# Patient Record
Sex: MALE | Race: WHITE | NOT HISPANIC OR LATINO | URBAN - METROPOLITAN AREA
[De-identification: names, ages, dates, MRNs, and addresses within clinical notes are randomized per-mention and may not be internally consistent; named-entity substitution may affect disease eponyms.]

---

## 2017-08-10 ENCOUNTER — INPATIENT (INPATIENT)
Facility: HOSPITAL | Age: 57
LOS: 3 days | Discharge: HOME | DRG: 309 | End: 2017-08-14
Attending: INTERNAL MEDICINE | Admitting: INTERNAL MEDICINE
Payer: COMMERCIAL

## 2017-08-10 VITALS
HEIGHT: 71 IN | HEART RATE: 115 BPM | DIASTOLIC BLOOD PRESSURE: 79 MMHG | TEMPERATURE: 98 F | OXYGEN SATURATION: 96 % | RESPIRATION RATE: 14 BRPM | SYSTOLIC BLOOD PRESSURE: 124 MMHG | WEIGHT: 315 LBS

## 2017-08-10 DIAGNOSIS — I48.91 UNSPECIFIED ATRIAL FIBRILLATION: ICD-10-CM

## 2017-08-10 DIAGNOSIS — Z98.89 OTHER SPECIFIED POSTPROCEDURAL STATES: Chronic | ICD-10-CM

## 2017-08-10 DIAGNOSIS — Z90.49 ACQUIRED ABSENCE OF OTHER SPECIFIED PARTS OF DIGESTIVE TRACT: Chronic | ICD-10-CM

## 2017-08-10 DIAGNOSIS — K74.60 UNSPECIFIED CIRRHOSIS OF LIVER: ICD-10-CM

## 2017-08-10 DIAGNOSIS — E66.9 OBESITY, UNSPECIFIED: ICD-10-CM

## 2017-08-10 LAB
ALBUMIN SERPL ELPH-MCNC: 3.7 G/DL — SIGNIFICANT CHANGE UP (ref 3.3–5)
ALP SERPL-CCNC: 61 U/L — SIGNIFICANT CHANGE UP (ref 30–120)
ALT FLD-CCNC: 38 U/L DA — SIGNIFICANT CHANGE UP (ref 10–60)
ANION GAP SERPL CALC-SCNC: 10 MMOL/L — SIGNIFICANT CHANGE UP (ref 5–17)
APTT BLD: 29.4 SEC — SIGNIFICANT CHANGE UP (ref 27.5–37.4)
AST SERPL-CCNC: 27 U/L — SIGNIFICANT CHANGE UP (ref 10–40)
BASOPHILS # BLD AUTO: 0.1 K/UL — SIGNIFICANT CHANGE UP (ref 0–0.2)
BASOPHILS NFR BLD AUTO: 0.8 % — SIGNIFICANT CHANGE UP (ref 0–2)
BILIRUB SERPL-MCNC: 0.5 MG/DL — SIGNIFICANT CHANGE UP (ref 0.2–1.2)
BUN SERPL-MCNC: 18 MG/DL — SIGNIFICANT CHANGE UP (ref 7–23)
CALCIUM SERPL-MCNC: 8.6 MG/DL — SIGNIFICANT CHANGE UP (ref 8.4–10.5)
CHLORIDE SERPL-SCNC: 106 MMOL/L — SIGNIFICANT CHANGE UP (ref 96–108)
CO2 SERPL-SCNC: 25 MMOL/L — SIGNIFICANT CHANGE UP (ref 22–31)
CREAT SERPL-MCNC: 0.88 MG/DL — SIGNIFICANT CHANGE UP (ref 0.5–1.3)
EOSINOPHIL # BLD AUTO: 0.2 K/UL — SIGNIFICANT CHANGE UP (ref 0–0.5)
EOSINOPHIL NFR BLD AUTO: 1.6 % — SIGNIFICANT CHANGE UP (ref 0–6)
GLUCOSE SERPL-MCNC: 104 MG/DL — HIGH (ref 70–99)
HCT VFR BLD CALC: 41 % — SIGNIFICANT CHANGE UP (ref 39–50)
HGB BLD-MCNC: 13 G/DL — SIGNIFICANT CHANGE UP (ref 13–17)
INR BLD: 1.17 RATIO — HIGH (ref 0.88–1.16)
LYMPHOCYTES # BLD AUTO: 18.4 % — SIGNIFICANT CHANGE UP (ref 13–44)
LYMPHOCYTES # BLD AUTO: 2.2 K/UL — SIGNIFICANT CHANGE UP (ref 1–3.3)
MCHC RBC-ENTMCNC: 29.6 PG — SIGNIFICANT CHANGE UP (ref 27–34)
MCHC RBC-ENTMCNC: 31.8 GM/DL — LOW (ref 32–36)
MCV RBC AUTO: 93.3 FL — SIGNIFICANT CHANGE UP (ref 80–100)
MONOCYTES # BLD AUTO: 0.8 K/UL — SIGNIFICANT CHANGE UP (ref 0–0.9)
MONOCYTES NFR BLD AUTO: 6.8 % — SIGNIFICANT CHANGE UP (ref 2–14)
NEUTROPHILS # BLD AUTO: 8.6 K/UL — HIGH (ref 1.8–7.4)
NEUTROPHILS NFR BLD AUTO: 72.4 % — SIGNIFICANT CHANGE UP (ref 43–77)
PLATELET # BLD AUTO: 195 K/UL — SIGNIFICANT CHANGE UP (ref 150–400)
POTASSIUM SERPL-MCNC: 3.8 MMOL/L — SIGNIFICANT CHANGE UP (ref 3.5–5.3)
POTASSIUM SERPL-SCNC: 3.8 MMOL/L — SIGNIFICANT CHANGE UP (ref 3.5–5.3)
PROT SERPL-MCNC: 7.1 G/DL — SIGNIFICANT CHANGE UP (ref 6–8.3)
PROTHROM AB SERPL-ACNC: 12.8 SEC — HIGH (ref 9.8–12.7)
RBC # BLD: 4.39 M/UL — SIGNIFICANT CHANGE UP (ref 4.2–5.8)
RBC # FLD: 15.6 % — HIGH (ref 10.3–14.5)
SODIUM SERPL-SCNC: 141 MMOL/L — SIGNIFICANT CHANGE UP (ref 135–145)
TROPONIN I SERPL-MCNC: 0.04 NG/ML — SIGNIFICANT CHANGE UP (ref 0.02–0.06)
WBC # BLD: 11.8 K/UL — HIGH (ref 3.8–10.5)
WBC # FLD AUTO: 11.8 K/UL — HIGH (ref 3.8–10.5)

## 2017-08-10 PROCEDURE — 99291 CRITICAL CARE FIRST HOUR: CPT

## 2017-08-10 PROCEDURE — 93306 TTE W/DOPPLER COMPLETE: CPT | Mod: 26

## 2017-08-10 PROCEDURE — 71010: CPT | Mod: 26

## 2017-08-10 PROCEDURE — 93010 ELECTROCARDIOGRAM REPORT: CPT

## 2017-08-10 RX ORDER — SODIUM CHLORIDE 9 MG/ML
1000 INJECTION INTRAMUSCULAR; INTRAVENOUS; SUBCUTANEOUS ONCE
Qty: 0 | Refills: 0 | Status: COMPLETED | OUTPATIENT
Start: 2017-08-10 | End: 2017-08-10

## 2017-08-10 RX ORDER — ASPIRIN/CALCIUM CARB/MAGNESIUM 324 MG
81 TABLET ORAL DAILY
Qty: 0 | Refills: 0 | Status: DISCONTINUED | OUTPATIENT
Start: 2017-08-11 | End: 2017-08-11

## 2017-08-10 RX ORDER — SODIUM CHLORIDE 9 MG/ML
3 INJECTION INTRAMUSCULAR; INTRAVENOUS; SUBCUTANEOUS ONCE
Qty: 0 | Refills: 0 | Status: COMPLETED | OUTPATIENT
Start: 2017-08-10 | End: 2017-08-10

## 2017-08-10 RX ORDER — SODIUM CHLORIDE 9 MG/ML
500 INJECTION INTRAMUSCULAR; INTRAVENOUS; SUBCUTANEOUS
Qty: 0 | Refills: 0 | Status: DISCONTINUED | OUTPATIENT
Start: 2017-08-10 | End: 2017-08-10

## 2017-08-10 RX ORDER — ENOXAPARIN SODIUM 100 MG/ML
40 INJECTION SUBCUTANEOUS DAILY
Qty: 0 | Refills: 0 | Status: DISCONTINUED | OUTPATIENT
Start: 2017-08-10 | End: 2017-08-11

## 2017-08-10 RX ADMIN — Medication 81 MILLIGRAM(S): at 23:54

## 2017-08-10 RX ADMIN — SODIUM CHLORIDE 1000 MILLILITER(S): 9 INJECTION INTRAMUSCULAR; INTRAVENOUS; SUBCUTANEOUS at 22:33

## 2017-08-10 RX ADMIN — SODIUM CHLORIDE 3 MILLILITER(S): 9 INJECTION INTRAMUSCULAR; INTRAVENOUS; SUBCUTANEOUS at 21:02

## 2017-08-10 NOTE — H&P ADULT - ATTENDING COMMENTS
I have discussed care plan with patient expressed understanding of problems treatment and their effect and side effects, alternatives in detail,I have asked if they have any questions and concerns and appropriately addressed them to best of my ability  Reviewed all diagonostic tests, lab results and drug drug interactions, and medications  90 minutes spent on this visit, 50% visit time spent in care co-ordination with other attendings and counselling patient

## 2017-08-10 NOTE — ED ADULT NURSE NOTE - OBJECTIVE STATEMENT
Pt states he has not been feeling well for the past 6 weeks. Pt reports being treated with antibiotics, steroids, and inhaler for a cough and white sputum. Pt reports feeling short of breath, palpitations, weakness and difficulty breathing. Pt is morbidly obese now, breathing with mild difficulty, non-productive cough and flushed face. Pt able to ambulated to stretcher, no dizziness or chest pain. Pt saw Dr. Alamo today where he had an EKG done and was sent here. Pt changed into a gown and EKG done while IVL inserted. NC applied at 3L.

## 2017-08-10 NOTE — ED PROVIDER NOTE - OBJECTIVE STATEMENT
55 y/o morbidly obese M pt with no significant PMHx presents to the ED sent in by doctor for new onset a fib. Pt notes worsening swelling in his lower extremities. No previous hx of a fib. Pt states he is a  and works 14 hours a day, is active on feet throughout the day. Pt denies SOB, calf pain, recent travel or any other complaints at this time. 57 y/o morbidly obese M pt w/ PMHx acute liver failure w/out hepatic coma, A fib, ETOH abuse, hepatic encephalopathy, umbilical hernia presents to the ED sent in by doctor for new onset a fib. Pt notes worsening swelling in his lower extremities. Pt states he is a  and works 14 hours a day, is active on feet throughout the day. Pt denies SOB, calf pain, recent travel or any other complaints at this time.

## 2017-08-10 NOTE — ED ADULT NURSE REASSESSMENT NOTE - NS ED NURSE REASSESS COMMENT FT1
Pt is feeling better following cardizem administration. Pt is breathing easier and denies any chest discomfort. Pt remains on CM for rhythm eval.

## 2017-08-10 NOTE — ED ADULT NURSE NOTE - PMH
Acute liver failure without hepatic coma    Alcohol abuse    Atrial fibrillation, unspecified type    Hepatic encephalopathy    Hernia of abdominal cavity    Umbilical hernia

## 2017-08-10 NOTE — ED PROVIDER NOTE - MEDICAL DECISION MAKING DETAILS
58 y/o M sent in for tachycardia found to have new onset a fib. Plan - rate control, labs, anticoagulation, reevaluation.

## 2017-08-10 NOTE — H&P ADULT - ASSESSMENT
57 y/o morbidly obese M pt w/ PMHx acute liver failure w/out hepatic coma, A fib, ETOH abuse, hepatic encephalopathy, umbilical hernia presents to the ED sent in by doctor for new onset a fib. Pt notes worsening swelling in his lower extremities. Pt states he is a  and works 14 hours a day, is active on feet throughout the day. Pt was having cough and SOB and though that he had infection and was not getting better and found to have afib RVR  ,no  calf pain, recent travel or any other complaints at this time.  no fever, no urinary complaint,no bowel complaints  admittedwith AFIB RVR with h/o ETOH abuse , hepatic cirrhosis with ascites

## 2017-08-10 NOTE — ED ADULT NURSE NOTE - PSH
H/O umbilical hernia repair    History of appendectomy    History of hernia repair    Status post small bowel resection

## 2017-08-10 NOTE — ED PROVIDER NOTE - PROGRESS NOTE DETAILS
Discussed case with Dr. Bashir (cardiology), understands patients presentation will come to see him in the ED tonight. Discussed case with Dr. Cm (cardiology), understands patients presentation will come to see him in the ED tonight.

## 2017-08-10 NOTE — ED PROVIDER NOTE - CONSTITUTIONAL, MLM
normal... Well appearing, morbidly obese, awake, alert, oriented to person, place, time/situation and in no apparent distress.

## 2017-08-10 NOTE — H&P ADULT - HISTORY OF PRESENT ILLNESS
57 y/o morbidly obese M pt w/ PMHx acute liver failure w/out hepatic coma, A fib, ETOH abuse, hepatic encephalopathy, umbilical hernia presents to the ED sent in by doctor for new onset a fib. Pt notes worsening swelling in his lower extremities. Pt states he is a  and works 14 hours a day, is active on feet throughout the day. Pt was having cough and SOB and though that he had infection and was not getting better and found to have afib RVR  ,no  calf pain, recent travel or any other complaints at this time. no fever, no urinary complaint,no bowel complaints

## 2017-08-11 DIAGNOSIS — J45.909 UNSPECIFIED ASTHMA, UNCOMPLICATED: ICD-10-CM

## 2017-08-11 DIAGNOSIS — G47.30 SLEEP APNEA, UNSPECIFIED: ICD-10-CM

## 2017-08-11 DIAGNOSIS — F10.10 ALCOHOL ABUSE, UNCOMPLICATED: ICD-10-CM

## 2017-08-11 DIAGNOSIS — K21.9 GASTRO-ESOPHAGEAL REFLUX DISEASE WITHOUT ESOPHAGITIS: ICD-10-CM

## 2017-08-11 DIAGNOSIS — J44.9 CHRONIC OBSTRUCTIVE PULMONARY DISEASE, UNSPECIFIED: ICD-10-CM

## 2017-08-11 DIAGNOSIS — I50.9 HEART FAILURE, UNSPECIFIED: ICD-10-CM

## 2017-08-11 DIAGNOSIS — E66.01 MORBID (SEVERE) OBESITY DUE TO EXCESS CALORIES: ICD-10-CM

## 2017-08-11 LAB
ALBUMIN SERPL ELPH-MCNC: 3.6 G/DL — SIGNIFICANT CHANGE UP (ref 3.3–5)
ALP SERPL-CCNC: 53 U/L — SIGNIFICANT CHANGE UP (ref 30–120)
ALT FLD-CCNC: 37 U/L DA — SIGNIFICANT CHANGE UP (ref 10–60)
AMMONIA BLD-MCNC: 40 UMOL/L — HIGH (ref 11–32)
ANION GAP SERPL CALC-SCNC: 8 MMOL/L — SIGNIFICANT CHANGE UP (ref 5–17)
APPEARANCE UR: CLEAR — SIGNIFICANT CHANGE UP
AST SERPL-CCNC: 25 U/L — SIGNIFICANT CHANGE UP (ref 10–40)
BILIRUB SERPL-MCNC: 0.5 MG/DL — SIGNIFICANT CHANGE UP (ref 0.2–1.2)
BILIRUB UR-MCNC: NEGATIVE — SIGNIFICANT CHANGE UP
BUN SERPL-MCNC: 17 MG/DL — SIGNIFICANT CHANGE UP (ref 7–23)
CALCIUM SERPL-MCNC: 8.6 MG/DL — SIGNIFICANT CHANGE UP (ref 8.4–10.5)
CHLORIDE SERPL-SCNC: 104 MMOL/L — SIGNIFICANT CHANGE UP (ref 96–108)
CO2 SERPL-SCNC: 27 MMOL/L — SIGNIFICANT CHANGE UP (ref 22–31)
COLOR SPEC: YELLOW — SIGNIFICANT CHANGE UP
CREAT SERPL-MCNC: 0.7 MG/DL — SIGNIFICANT CHANGE UP (ref 0.5–1.3)
DIFF PNL FLD: ABNORMAL
GLUCOSE SERPL-MCNC: 116 MG/DL — HIGH (ref 70–99)
GLUCOSE UR QL: NEGATIVE MG/DL — SIGNIFICANT CHANGE UP
HBA1C BLD-MCNC: 7 % — HIGH (ref 4–5.6)
HCT VFR BLD CALC: 40.4 % — SIGNIFICANT CHANGE UP (ref 39–50)
HGB BLD-MCNC: 12.7 G/DL — LOW (ref 13–17)
KETONES UR-MCNC: NEGATIVE — SIGNIFICANT CHANGE UP
LEUKOCYTE ESTERASE UR-ACNC: NEGATIVE — SIGNIFICANT CHANGE UP
MAGNESIUM SERPL-MCNC: 1.3 MG/DL — LOW (ref 1.6–2.6)
MAGNESIUM SERPL-MCNC: 1.6 MG/DL — SIGNIFICANT CHANGE UP (ref 1.6–2.6)
MCHC RBC-ENTMCNC: 29.2 PG — SIGNIFICANT CHANGE UP (ref 27–34)
MCHC RBC-ENTMCNC: 31.4 GM/DL — LOW (ref 32–36)
MCV RBC AUTO: 93.1 FL — SIGNIFICANT CHANGE UP (ref 80–100)
NITRITE UR-MCNC: NEGATIVE — SIGNIFICANT CHANGE UP
NT-PROBNP SERPL-SCNC: 788 PG/ML — HIGH (ref 0–125)
PH UR: 6 — SIGNIFICANT CHANGE UP (ref 5–8)
PHOSPHATE SERPL-MCNC: 3.4 MG/DL — SIGNIFICANT CHANGE UP (ref 2.5–4.5)
PHOSPHATE SERPL-MCNC: 4.1 MG/DL — SIGNIFICANT CHANGE UP (ref 2.5–4.5)
PLATELET # BLD AUTO: 178 K/UL — SIGNIFICANT CHANGE UP (ref 150–400)
POTASSIUM SERPL-MCNC: 3.5 MMOL/L — SIGNIFICANT CHANGE UP (ref 3.5–5.3)
POTASSIUM SERPL-SCNC: 3.5 MMOL/L — SIGNIFICANT CHANGE UP (ref 3.5–5.3)
PROT SERPL-MCNC: 7 G/DL — SIGNIFICANT CHANGE UP (ref 6–8.3)
PROT UR-MCNC: 30 MG/DL
RBC # BLD: 4.35 M/UL — SIGNIFICANT CHANGE UP (ref 4.2–5.8)
RBC # FLD: 15.5 % — HIGH (ref 10.3–14.5)
SODIUM SERPL-SCNC: 139 MMOL/L — SIGNIFICANT CHANGE UP (ref 135–145)
SP GR SPEC: 1.01 — SIGNIFICANT CHANGE UP (ref 1.01–1.02)
TROPONIN I SERPL-MCNC: 0.03 NG/ML — SIGNIFICANT CHANGE UP (ref 0.02–0.06)
TSH SERPL-MCNC: 1.41 UIU/ML — SIGNIFICANT CHANGE UP (ref 0.27–4.2)
UROBILINOGEN FLD QL: NEGATIVE MG/DL — SIGNIFICANT CHANGE UP
WBC # BLD: 12.5 K/UL — HIGH (ref 3.8–10.5)
WBC # FLD AUTO: 12.5 K/UL — HIGH (ref 3.8–10.5)

## 2017-08-11 PROCEDURE — 93970 EXTREMITY STUDY: CPT | Mod: 26

## 2017-08-11 PROCEDURE — 71250 CT THORAX DX C-: CPT | Mod: 26

## 2017-08-11 RX ORDER — FUROSEMIDE 40 MG
40 TABLET ORAL DAILY
Qty: 0 | Refills: 0 | Status: DISCONTINUED | OUTPATIENT
Start: 2017-08-11 | End: 2017-08-11

## 2017-08-11 RX ORDER — LANOLIN ALCOHOL/MO/W.PET/CERES
6 CREAM (GRAM) TOPICAL ONCE
Qty: 0 | Refills: 0 | Status: COMPLETED | OUTPATIENT
Start: 2017-08-11 | End: 2017-08-11

## 2017-08-11 RX ORDER — BUDESONIDE AND FORMOTEROL FUMARATE DIHYDRATE 160; 4.5 UG/1; UG/1
2 AEROSOL RESPIRATORY (INHALATION)
Qty: 0 | Refills: 0 | Status: DISCONTINUED | OUTPATIENT
Start: 2017-08-11 | End: 2017-08-14

## 2017-08-11 RX ORDER — MAGNESIUM SULFATE 500 MG/ML
2 VIAL (ML) INJECTION ONCE
Qty: 0 | Refills: 0 | Status: COMPLETED | OUTPATIENT
Start: 2017-08-11 | End: 2017-08-11

## 2017-08-11 RX ORDER — FOLIC ACID 0.8 MG
1 TABLET ORAL DAILY
Qty: 0 | Refills: 0 | Status: DISCONTINUED | OUTPATIENT
Start: 2017-08-11 | End: 2017-08-14

## 2017-08-11 RX ORDER — THIAMINE MONONITRATE (VIT B1) 100 MG
100 TABLET ORAL DAILY
Qty: 0 | Refills: 0 | Status: DISCONTINUED | OUTPATIENT
Start: 2017-08-11 | End: 2017-08-14

## 2017-08-11 RX ORDER — FUROSEMIDE 40 MG
40 TABLET ORAL EVERY 12 HOURS
Qty: 0 | Refills: 0 | Status: DISCONTINUED | OUTPATIENT
Start: 2017-08-11 | End: 2017-08-14

## 2017-08-11 RX ORDER — METOPROLOL TARTRATE 50 MG
25 TABLET ORAL EVERY 6 HOURS
Qty: 0 | Refills: 0 | Status: DISCONTINUED | OUTPATIENT
Start: 2017-08-11 | End: 2017-08-13

## 2017-08-11 RX ORDER — PANTOPRAZOLE SODIUM 20 MG/1
40 TABLET, DELAYED RELEASE ORAL
Qty: 0 | Refills: 0 | Status: DISCONTINUED | OUTPATIENT
Start: 2017-08-11 | End: 2017-08-14

## 2017-08-11 RX ORDER — APIXABAN 2.5 MG/1
5 TABLET, FILM COATED ORAL EVERY 12 HOURS
Qty: 0 | Refills: 0 | Status: DISCONTINUED | OUTPATIENT
Start: 2017-08-11 | End: 2017-08-14

## 2017-08-11 RX ORDER — LANOLIN ALCOHOL/MO/W.PET/CERES
6 CREAM (GRAM) TOPICAL AT BEDTIME
Qty: 0 | Refills: 0 | Status: DISCONTINUED | OUTPATIENT
Start: 2017-08-11 | End: 2017-08-14

## 2017-08-11 RX ADMIN — Medication 50 GRAM(S): at 00:54

## 2017-08-11 RX ADMIN — APIXABAN 5 MILLIGRAM(S): 2.5 TABLET, FILM COATED ORAL at 17:05

## 2017-08-11 RX ADMIN — Medication 25 MILLIGRAM(S): at 17:05

## 2017-08-11 RX ADMIN — Medication 100 MILLIGRAM(S): at 11:06

## 2017-08-11 RX ADMIN — Medication 25 MILLIGRAM(S): at 23:55

## 2017-08-11 RX ADMIN — Medication 40 MILLIGRAM(S): at 22:14

## 2017-08-11 RX ADMIN — Medication 6 MILLIGRAM(S): at 22:14

## 2017-08-11 RX ADMIN — Medication 6 MILLIGRAM(S): at 01:52

## 2017-08-11 RX ADMIN — Medication 40 MILLIGRAM(S): at 08:55

## 2017-08-11 RX ADMIN — Medication 50 GRAM(S): at 07:40

## 2017-08-11 RX ADMIN — PANTOPRAZOLE SODIUM 40 MILLIGRAM(S): 20 TABLET, DELAYED RELEASE ORAL at 06:39

## 2017-08-11 RX ADMIN — Medication 40 MILLIGRAM(S): at 10:27

## 2017-08-11 RX ADMIN — Medication 25 MILLIGRAM(S): at 11:06

## 2017-08-11 RX ADMIN — Medication 1 TABLET(S): at 11:06

## 2017-08-11 RX ADMIN — Medication 1 MILLIGRAM(S): at 11:06

## 2017-08-11 NOTE — PROGRESS NOTE ADULT - SUBJECTIVE AND OBJECTIVE BOX
Patient is a 56y old  Male who presents with a chief complaint of sent pt from 's office for a-fib (11 Aug 2017 01:17)      INTERVAL HPI/OVERNIGHT EVENTS:no acute event overnight    MEDICATIONS  (STANDING):  diltiazem Infusion 5 mG/Hr (5 mL/Hr) IV Continuous <Continuous>  thiamine 100 milliGRAM(s) Oral daily  folic acid 1 milliGRAM(s) Oral daily  multivitamin 1 Tablet(s) Oral daily  pantoprazole    Tablet 40 milliGRAM(s) Oral before breakfast  buDESOnide 160 MICROgram(s)/formoterol 4.5 MICROgram(s) Inhaler 2 Puff(s) Inhalation two times a day  furosemide   Injectable 40 milliGRAM(s) IV Push every 12 hours  apixaban 5 milliGRAM(s) Oral every 12 hours  metoprolol 25 milliGRAM(s) Oral every 6 hours    MEDICATIONS  (PRN):  melatonin 6 milliGRAM(s) Oral at bedtime PRN Insomnia      Allergies    No Known Allergies    Intolerances          Vital Signs Last 24 Hrs  T(C): 36.4 (11 Aug 2017 08:06), Max: 36.9 (10 Aug 2017 23:27)  T(F): 97.5 (11 Aug 2017 08:06), Max: 98.4 (10 Aug 2017 23:27)  HR: 112 (11 Aug 2017 10:00) (106 - 142)  BP: 116/85 (11 Aug 2017 10:00) (100/78 - 143/100)  BP(mean): 96 (11 Aug 2017 10:00) (89 - 112)  RR: 22 (11 Aug 2017 10:00) (14 - 27)  SpO2: 90% (11 Aug 2017 10:00) (90% - 98%)    LABS:                        12.7   12.5  )-----------( 178      ( 11 Aug 2017 06:10 )             40.4     08-11    139  |  104  |  17  ----------------------------<  116<H>  3.5   |  27  |  0.70    Ca    8.6      11 Aug 2017 06:10  Phos  4.1     08-11  Mg     1.6     08-11    TPro  7.0  /  Alb  3.6  /  TBili  0.5  /  DBili  x   /  AST  25  /  ALT  37  /  AlkPhos  53  08-11    PT/INR - ( 10 Aug 2017 20:34 )   PT: 12.8 sec;   INR: 1.17 ratio         PTT - ( 10 Aug 2017 20:34 )  PTT:29.4 sec  Urinalysis Basic - ( 11 Aug 2017 03:44 )    Color: Yellow / Appearance: Clear / S.015 / pH: x  Gluc: x / Ketone: Negative  / Bili: Negative / Urobili: Negative mg/dL   Blood: x / Protein: 30 mg/dL / Nitrite: Negative   Leuk Esterase: Negative / RBC: 25-50 /HPF / WBC 0-2   Sq Epi: x / Non Sq Epi: x / Bacteria: x      CAPILLARY BLOOD GLUCOSE              08-10 @ 07: @ 07:00  --------------------------------------------------------  IN: 1115 mL / OUT: 500 mL / NET: 615 mL     @ 07: @ 10:52  --------------------------------------------------------  IN: 40 mL / OUT: 850 mL / NET: -810 mL          RADIOLOGY & ADDITIONAL TESTS:    Imaging Personally Reviewed:  [ x] YES  [ ] NO    Consultant(s) Notes Reviewed:  [x ] YES  [ ] NO    Care Discussed with Consultants/Other Providers [x ] YES  [ ] NO

## 2017-08-11 NOTE — PROGRESS NOTE ADULT - ASSESSMENT
57 yo male PMHx AFib not on AC, acute liver failure with hepatic encephalopathy without coma 1 year ago, EtOH abuse with delirium (hallucinations, no seizures or intubation), multiple abdominal surgeries for hernia repair, likely WILMER, morbid obesity, who presents complaining of worsening distal lower extremity edema, shortness of breath, dyspnea on exertion, and palpitations x 4-6 weeks. 55 yo male PMHx AFib not on AC, acute liver failure with hepatic encephalopathy without coma 1 year ago, EtOH abuse with delirium (hallucinations, no seizures or intubation), multiple abdominal surgeries for hernia repair, likely WILMER, morbid obesity, who presents complaining of worsening distal lower extremity edema, shortness of breath, dyspnea on exertion, and palpitations x 4-6 weeks. Episode of paroxysmal atrial fibrillation with RVR, uncontrolled. Given clinical presentation, suspect component of heart failure.

## 2017-08-11 NOTE — PROGRESS NOTE ADULT - SUBJECTIVE AND OBJECTIVE BOX
Patient is a 56y old  Male who presents with a chief complaint of sob, palpitation (10 Aug 2017 23:20)      BRIEF HOSPITAL COURSE: 55 yo male PMHx AFib not on AC, acute liver failure with hepatic encephalopathy without coma 1 year ago, EtOH abuse with delirium (hallucinations, no seizures or intubation), multiple abdominal surgeries for hernia repair, likely WILMER, morbid obesity, who presents complaining of worsening distal lower extremity edema, shortness of breath, dyspnea on exertion, and palpitations x 4-6 weeks. Also admits to intermittent abdominal swelling, which patient attributes to ascites. Was seen twice at Urgent Care when symptoms initially onset, which patient had attributed to seasonal allergies, treated with antibiotics and inhalers. Was also treated with Prednisone 40mg daily x 7 days, which patient tapered on his own, last dose 5mg taken today. Symptoms continued to worsen despite 2 antibiotics, inhalers, and steroids, and sought care by Pulmonologist Dr. Alamo today. Was found to be in rapid AFib () in office, referred to ED.     Social history: Last EtOH 5 days ago, 1 martini and 1 [employer]. Has been trying to cut down on EtOH consumption, admits to consuming alcohol twice weekly, mostly beer / vodka / [employer]. Was in rehab 1 year ago after acute withdrawal with delirium and hallucinations at [facility], but recently started drinking again due to increased stress from work. Denies illicit drug use.    Events last 24 hours: Upon arrival to ED, found to be in AFib with RVR, treated with Cardizem 20mg IVP with no response. Additional dose of Cardizem 10mg IVP given without response, and was therefore started on Cardizem gtt. Rate currently 130's on Cardizem 5mg IV infusion, BP borderline hypotensive. Denies chest pain. On NC.    PAST MEDICAL & SURGICAL HISTORY:  Atrial fibrillation, unspecified type  Hepatic encephalopathy  Acute liver failure without hepatic coma  Umbilical hernia  Hernia of abdominal cavity  Alcohol abuse  History of appendectomy  Status post small bowel resection  History of hernia repair  H/O umbilical hernia repair  H/O appendectomy      Review of Systems:  CONSTITUTIONAL: No fever, chills, or fatigue  EYES: No eye pain, visual disturbances, or discharge  ENMT:  No difficulty hearing, tinnitus, vertigo; No sinus or throat pain  NECK: No pain or stiffness  RESPIRATORY: +SHORTNESS OF BREATH / ORTHOPNEA / ?PND. No cough, wheezing, chills or hemoptysis  CARDIOVASCULAR: +PALPITATIONS, +LEG SWELLING. No chest pain  GASTROINTESTINAL: +ABDOMINAL DISTENTION. No abdominal or epigastric pain. No nausea, vomiting, or hematemesis; No diarrhea or constipation. No melena or hematochezia.  GENITOURINARY: No dysuria, frequency, hematuria, or incontinence  NEUROLOGICAL: No headaches, memory loss, loss of strength, numbness, or tremors  SKIN: No itching, burning, rashes, or lesions   MUSCULOSKELETAL: +CHRONIC B/L KNEE PAIN.  PSYCHIATRIC: +DIFFICULTY SLEEPING. +ANXIETY.      Medications:  diltiazem Infusion 5 mG/Hr IV Continuous <Continuous>  aspirin enteric coated 81 milliGRAM(s) Oral daily  enoxaparin Injectable 40 milliGRAM(s) SubCutaneous daily  pantoprazole    Tablet 40 milliGRAM(s) Oral before breakfast  thiamine 100 milliGRAM(s) Oral daily  folic acid 1 milliGRAM(s) Oral daily  multivitamin 1 Tablet(s) Oral daily      ICU Vital Signs Last 24 Hrs  T(C): 36.9 (10 Aug 2017 23:27), Max: 36.9 (10 Aug 2017 23:27)  T(F): 98.4 (10 Aug 2017 23:27), Max: 98.4 (10 Aug 2017 23:27)  HR: 130 (10 Aug 2017 23:27) (115 - 142)  BP: 108/72 (10 Aug 2017 23:27) (100/78 - 124/79)  BP(mean): --  ABP: --  ABP(mean): --  RR: 20 (10 Aug 2017 23:27) (14 - 22)  SpO2: 98% (10 Aug 2017 23:27) (93% - 98%)      I&O's Detail        LABS:                        13.0   11.8  )-----------( 195      ( 10 Aug 2017 20:34 )             41.0     08-10    141  |  106  |  18  ----------------------------<  104<H>  3.8   |  25  |  0.88    Ca    8.6      10 Aug 2017 20:34  Phos  3.4     08-10  Mg     1.3     08-10    TPro  7.1  /  Alb  3.7  /  TBili  0.5  /  DBili  x   /  AST  27  /  ALT  38  /  AlkPhos  61  08-10      CARDIAC MARKERS ( 10 Aug 2017 20:34 )  .043 ng/mL / x     / x     / x     / x          CAPILLARY BLOOD GLUCOSE        PT/INR - ( 10 Aug 2017 20:34 )   PT: 12.8 sec;   INR: 1.17 ratio         PTT - ( 10 Aug 2017 20:34 )  PTT:29.4 sec    CULTURES:      Physical Examination:    General: No acute distress.      HEENT: Pupils equal, reactive to light.  Symmetric.    PULM: Clear to auscultation bilaterally, no significant sputum production    CVS: Regular rate and rhythm, no murmurs, rubs, or gallops    ABD: Soft, nondistended, nontender, normoactive bowel sounds, no masses    EXT: No edema, nontender    SKIN: Warm and well perfused, no rashes noted.    NEURO: Alert, oriented, interactive, nonfocal    RADIOLOGY: ***    CRITICAL CARE TIME SPENT: I have spent *** minutes of critical care time evaluating and treating this patient, including reviewing charts, labs, imagining studies, and collaborating with interdisciplinary team. Patient is a 56y old  Male who presents with a chief complaint of sob, palpitation (10 Aug 2017 23:20)      BRIEF HOSPITAL COURSE: 57 yo male PMHx AFib not on AC, acute liver failure with hepatic encephalopathy without coma 1 year ago, EtOH abuse with delirium (hallucinations, no seizures or intubation), multiple abdominal surgeries for hernia repair, likely WILMER, morbid obesity, who presents complaining of worsening distal lower extremity edema, shortness of breath, dyspnea on exertion, and palpitations x 4-6 weeks. Also admits to intermittent abdominal swelling, which patient attributes to ascites. Was seen twice at Urgent Care when symptoms initially onset, which patient had attributed to seasonal allergies, treated with antibiotics and inhalers. Was also treated with Prednisone 40mg daily x 7 days, which patient tapered on his own, last dose 5mg taken today. Symptoms continued to worsen despite 2 antibiotics, inhalers, and steroids, and sought care by Pulmonologist Dr. Alamo today. Was found to be in rapid AFib () in office, referred to ED.     Social history: Last EtOH 5 days ago, 1 martini and 1 [employer]. Has been trying to cut down on EtOH consumption, admits to consuming alcohol twice weekly, mostly beer / vodka / [employer]. Was in rehab 1 year ago after acute withdrawal with delirium and hallucinations at [facility], but recently started drinking again due to increased stress from work. Denies illicit drug use.    Events last 24 hours: Upon arrival to ED, found to be in AFib with RVR, treated with Cardizem 20mg IVP with no response. Additional dose of Cardizem 10mg IVP given without response, and was therefore started on Cardizem gtt. Rate currently 130's on Cardizem 5mg IV infusion, BP borderline hypotensive. Denies chest pain. On NC. Initial troponin 0.043, . Evaluated in ED by Cardiology Dr. Cm, TTE performed pending official read.    PAST MEDICAL & SURGICAL HISTORY:  Atrial fibrillation, unspecified type  Hepatic encephalopathy  Acute liver failure without hepatic coma  Umbilical hernia  Hernia of abdominal cavity  Alcohol abuse  History of appendectomy  Status post small bowel resection  History of hernia repair  H/O umbilical hernia repair  H/O appendectomy      Review of Systems:  CONSTITUTIONAL: No fever, chills, or fatigue  EYES: No eye pain, visual disturbances, or discharge  ENMT:  No difficulty hearing, tinnitus, vertigo; No sinus or throat pain  NECK: No pain or stiffness  RESPIRATORY: +SHORTNESS OF BREATH / ORTHOPNEA / ?PND. No cough, wheezing, chills or hemoptysis  CARDIOVASCULAR: +PALPITATIONS, +LEG SWELLING. No chest pain  GASTROINTESTINAL: +ABDOMINAL DISTENTION. No abdominal or epigastric pain. No nausea, vomiting, or hematemesis; No diarrhea or constipation. No melena or hematochezia.  GENITOURINARY: No dysuria, frequency, hematuria, or incontinence  NEUROLOGICAL: No headaches, memory loss, loss of strength, numbness, or tremors  SKIN: No itching, burning, rashes, or lesions   MUSCULOSKELETAL: +CHRONIC B/L KNEE PAIN.  PSYCHIATRIC: +DIFFICULTY SLEEPING. +ANXIETY.      Medications:  diltiazem Infusion 5 mG/Hr IV Continuous <Continuous>  aspirin enteric coated 81 milliGRAM(s) Oral daily  enoxaparin Injectable 40 milliGRAM(s) SubCutaneous daily  pantoprazole    Tablet 40 milliGRAM(s) Oral before breakfast  thiamine 100 milliGRAM(s) Oral daily  folic acid 1 milliGRAM(s) Oral daily  multivitamin 1 Tablet(s) Oral daily      ICU Vital Signs Last 24 Hrs  T(C): 36.9 (10 Aug 2017 23:27), Max: 36.9 (10 Aug 2017 23:27)  T(F): 98.4 (10 Aug 2017 23:27), Max: 98.4 (10 Aug 2017 23:27)  HR: 130 (10 Aug 2017 23:27) (115 - 142)  BP: 108/72 (10 Aug 2017 23:27) (100/78 - 124/79)  BP(mean): --  ABP: --  ABP(mean): --  RR: 20 (10 Aug 2017 23:27) (14 - 22)  SpO2: 98% (10 Aug 2017 23:27) (93% - 98%)      I&O's Detail        LABS:                        13.0   11.8  )-----------( 195      ( 10 Aug 2017 20:34 )             41.0     08-10    141  |  106  |  18  ----------------------------<  104<H>  3.8   |  25  |  0.88    Ca    8.6      10 Aug 2017 20:34  Phos  3.4     08-10  Mg     1.3     08-10    TPro  7.1  /  Alb  3.7  /  TBili  0.5  /  DBili  x   /  AST  27  /  ALT  38  /  AlkPhos  61  08-10      CARDIAC MARKERS ( 10 Aug 2017 20:34 )  .043 ng/mL / x     / x     / x     / x          CAPILLARY BLOOD GLUCOSE        PT/INR - ( 10 Aug 2017 20:34 )   PT: 12.8 sec;   INR: 1.17 ratio         PTT - ( 10 Aug 2017 20:34 )  PTT:29.4 sec    CULTURES:      Physical Examination:    General: No acute distress.      HEENT: Pupils equal, reactive to light.  Symmetric.    PULM: Clear to auscultation bilaterally, no significant sputum production    CVS: Regular rate and rhythm, no murmurs, rubs, or gallops    ABD: Soft, nondistended, nontender, normoactive bowel sounds, no masses    EXT: No edema, nontender    SKIN: Warm and well perfused, no rashes noted.    NEURO: Alert, oriented, interactive, nonfocal    RADIOLOGY: CXR- loss of left hemidiaphragm    CRITICAL CARE TIME SPENT: I have spent 46 minutes of critical care time evaluating and treating this patient, including reviewing charts, labs, imagining studies, and collaborating with interdisciplinary team. Patient is a 56y old  Male who presents with a chief complaint of sob, palpitation (10 Aug 2017 23:20)      BRIEF HOSPITAL COURSE: 57 yo male PMHx AFib not on AC, acute liver failure with hepatic encephalopathy without coma 1 year ago, EtOH abuse with delirium (hallucinations, no seizures or intubation), multiple abdominal surgeries for hernia repair, likely WILMER, morbid obesity, who presents complaining of worsening distal lower extremity edema, shortness of breath, dyspnea on exertion, and palpitations x 4-6 weeks. Also admits to intermittent abdominal swelling, which patient attributes to ascites. Was seen twice at Urgent Care when symptoms initially onset, which patient had attributed to seasonal allergies, treated with antibiotics and inhalers. Was also treated with Prednisone 40mg daily x 7 days, which patient tapered on his own, last dose 5mg taken today. Symptoms continued to worsen despite 2 antibiotics, inhalers, and steroids, and sought care by Pulmonologist Dr. Alamo today. Was found to be in rapid AFib () in office, referred to ED.     Social history: Last EtOH 5 days ago, 1 martini and 1 [employer]. Has been trying to cut down on EtOH consumption, admits to consuming alcohol twice weekly, mostly beer / vodka / [employer]. Was in rehab 1 year ago after acute withdrawal with delirium and hallucinations at [facility], but recently started drinking again due to increased stress from work. Denies illicit drug use.    Events last 24 hours: Upon arrival to ED, found to be in AFib with RVR, treated with Cardizem 20mg IVP with no response. Additional dose of Cardizem 10mg IVP given without response, and was therefore started on Cardizem gtt. Rate currently 130's on Cardizem 5mg IV infusion, BP borderline hypotensive. Denies chest pain. On NC. Initial troponin 0.043, . Evaluated in ED by Cardiology Dr. Cm, TTE performed pending official read.    PAST MEDICAL & SURGICAL HISTORY:  Atrial fibrillation, unspecified type  Hepatic encephalopathy  Acute liver failure without hepatic coma  Umbilical hernia  Hernia of abdominal cavity  Alcohol abuse  History of appendectomy  Status post small bowel resection  History of hernia repair  H/O umbilical hernia repair  H/O appendectomy      Review of Systems:  CONSTITUTIONAL: No fever, chills, or fatigue  EYES: No eye pain, visual disturbances, or discharge  ENMT:  No difficulty hearing, tinnitus, vertigo; No sinus or throat pain  NECK: No pain or stiffness  RESPIRATORY: +SHORTNESS OF BREATH / ORTHOPNEA / ?PND. No cough, wheezing, chills or hemoptysis  CARDIOVASCULAR: +PALPITATIONS, +LEG SWELLING. No chest pain  GASTROINTESTINAL: +ABDOMINAL DISTENTION. No abdominal or epigastric pain. No nausea, vomiting, or hematemesis; No diarrhea or constipation. No melena or hematochezia.  GENITOURINARY: No dysuria, frequency, hematuria, or incontinence  NEUROLOGICAL: No headaches, memory loss, loss of strength, numbness, or tremors  SKIN: No itching, burning, rashes, or lesions   MUSCULOSKELETAL: +CHRONIC B/L KNEE PAIN.  PSYCHIATRIC: +DIFFICULTY SLEEPING. +ANXIETY.      Medications:  diltiazem Infusion 5 mG/Hr IV Continuous <Continuous>  aspirin enteric coated 81 milliGRAM(s) Oral daily  enoxaparin Injectable 40 milliGRAM(s) SubCutaneous daily  pantoprazole    Tablet 40 milliGRAM(s) Oral before breakfast  thiamine 100 milliGRAM(s) Oral daily  folic acid 1 milliGRAM(s) Oral daily  multivitamin 1 Tablet(s) Oral daily      ICU Vital Signs Last 24 Hrs  T(C): 36.9 (10 Aug 2017 23:27), Max: 36.9 (10 Aug 2017 23:27)  T(F): 98.4 (10 Aug 2017 23:27), Max: 98.4 (10 Aug 2017 23:27)  HR: 130 (10 Aug 2017 23:27) (115 - 142)  BP: 108/72 (10 Aug 2017 23:27) (100/78 - 124/79)  BP(mean): --  ABP: --  ABP(mean): --  RR: 20 (10 Aug 2017 23:27) (14 - 22)  SpO2: 98% (10 Aug 2017 23:27) (93% - 98%)      I&O's Detail        LABS:                        13.0   11.8  )-----------( 195      ( 10 Aug 2017 20:34 )             41.0     08-10    141  |  106  |  18  ----------------------------<  104<H>  3.8   |  25  |  0.88    Ca    8.6      10 Aug 2017 20:34  Phos  3.4     08-10  Mg     1.3     08-10    TPro  7.1  /  Alb  3.7  /  TBili  0.5  /  DBili  x   /  AST  27  /  ALT  38  /  AlkPhos  61  08-10      CARDIAC MARKERS ( 10 Aug 2017 20:34 )  .043 ng/mL / x     / x     / x     / x          CAPILLARY BLOOD GLUCOSE        PT/INR - ( 10 Aug 2017 20:34 )   PT: 12.8 sec;   INR: 1.17 ratio         PTT - ( 10 Aug 2017 20:34 )  PTT:29.4 sec    CULTURES:      Physical Examination:    General: Morbidly obese male lying in bed in no acute distress.      HEENT: Pupils equal, reactive to light. Symmetric.    PULM: Diminished throughout bilaterally, no significant sputum production    CVS: Irregular rate and rhythm, no murmurs, rubs, or gallops    ABD: Multiple well-healed abdominal incisional scars appreciated. Umbilical hernia present. Soft, distended, nontender, normoactive bowel sounds, no masses.    EXT: 3+ bilateral lower extremity pitting edema with chronic skin changes rubor and small fluid-filled blisters to bilateral anterior shins, nontender    SKIN: Warm and well perfused, no rashes noted.    NEURO: Alert, oriented, interactive, nonfocal    RADIOLOGY: CXR- loss of left hemidiaphragm    POINT OF CARE ULTRASOUND: No significant abdominal ascites appreciated    CRITICAL CARE TIME SPENT: I have spent 46 minutes of critical care time evaluating and treating this patient, including reviewing charts, labs, imagining studies, and collaborating with interdisciplinary team.

## 2017-08-11 NOTE — DIETITIAN INITIAL EVALUATION ADULT. - OTHER INFO
Pt admitted c new onset Afib hx of ETOH abuse (in recovery) hepatic encephalopathy, liver failure. Pt morbidly obese and is a . He is aware of risks associated c being overweight. He is very knowledgeable about food and knows how to consume a balanced, healthy diet. He admits to mindless eating and making poor choices. Briefly reviewed strategies to help promote weight loss Emphasized heart healthy  balanced meals and portion size. Provided written and verbal diet instruction. Pt very receptive.

## 2017-08-11 NOTE — CONSULT NOTE ADULT - NSHPATTENDINGPLANDISCUSS_GEN_ALL_CORE
The parent is here for a consult and I explained the side effects of ADHD stimulants. Parent is aware of palpitations and denies family history of heart disease. The side effects of abdominal pain and headaches which can be treated with tylenol were discussed. Insomnia and irritability with can be treated with certain adjuvant therapies like clonidine and guanfacine were discussed. Lastly, transient anorexia was discussed and the possibility of using periactin as needed. Parent will begin low dose and call with 1 week up date to consider increasing medication. Med check every 6 months and call monthly for refill.      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    Diagnoses and all orders for this visit:    ADHD (attention deficit hyperactivity disorder), inattentive type    observe. Continue clonidine    
DARYL dhillon in detail

## 2017-08-11 NOTE — CONSULT NOTE ADULT - ASSESSMENT
Morbidly obese pt. with paroxysmal Atrial Fib, now with rapid rate.  Underlying COPD/Asthma, Sleep apnea. Probable CHF, most likely right heart failure and ? Cirrhosis.
The patient is a 56 year old male with a history of alcohol abuse, cirrhosis, paroxysmal atrial fibrillation who is admitted with rapid atrial fibrillation.    Plan:  - Given diltiazem IV (30 mg total) with some effect, still remains HR around 120. Will start diltiazem drip at 5 mg/hr given BPs are around 100-110 systolic.  - If BP remains border and patient still tachycardic, will start digoxin  - [id number] is 0. However, unknown history of heart failure (significant LE edema) and patient states he is a borderline diabetic. Use prophylactic doses of anticoagulation for now. Further anticoagulation plan to be based on other studies.  - Start aspirin 81 mg daily  - Check HbA1c  - Check TSH  - Check echocardiogram  - Check BNP  - Check CXR  - Patient given fluids in ED due to borderline BP. May eventually need diuretics given possible heart failure and significant LE edema.

## 2017-08-11 NOTE — PROGRESS NOTE ADULT - SUBJECTIVE AND OBJECTIVE BOX
Chief Complaint: Shortness of breath    Interval Events: Started on diltiazem drip, HR improved but still elevated around 110. Breathing improved.    Review of Systems:  General: No fevers, chills, weight loss or gain  Skin: No rashes, color changes  Cardiovascular: No chest pain, orthopnea  Respiratory: No shortness of breath, cough  Gastrointestinal: No nausea, abdominal pain  Genitourinary: No incontinence, pain with urination  Musculoskeletal: No pain, swelling, decreased range of motion  Neurological: No headache, weakness  Psychiatric: No depression, anxiety  Endocrine: No weight loss or gain, increased thirst  All other systems are comprehensively negative.    Physical Exam:  Vitals:        Vital Signs Last 24 Hrs  T(C): 36.4 (11 Aug 2017 08:06), Max: 36.9 (10 Aug 2017 23:27)  T(F): 97.5 (11 Aug 2017 08:06), Max: 98.4 (10 Aug 2017 23:27)  HR: 112 (11 Aug 2017 10:00) (106 - 142)  BP: 116/85 (11 Aug 2017 10:00) (100/78 - 143/100)  BP(mean): 96 (11 Aug 2017 10:00) (89 - 112)  RR: 22 (11 Aug 2017 10:00) (14 - 27)  SpO2: 90% (11 Aug 2017 10:00) (90% - 98%)  General: NAD  Neck: No JVD, no carotid bruit  Lungs: rales at bases  CV: RRR, nl S1/S2, no M/R/G  Abdomen: S/NT/ND, +BS  Extremities: 3+ LE edema, no cyanosis    Labs:                        12.7   12.5  )-----------( 178      ( 11 Aug 2017 06:10 )             40.4     08-11    139  |  104  |  17  ----------------------------<  116<H>  3.5   |  27  |  0.70    Ca    8.6      11 Aug 2017 06:10  Phos  4.1     08-11  Mg     1.6     08-11    TPro  7.0  /  Alb  3.6  /  TBili  0.5  /  DBili  x   /  AST  25  /  ALT  37  /  AlkPhos  53  08-11    CARDIAC MARKERS ( 11 Aug 2017 02:13 )  .025 ng/mL / x     / x     / x     / x      CARDIAC MARKERS ( 10 Aug 2017 20:34 )  .043 ng/mL / x     / x     / x     / x          PT/INR - ( 10 Aug 2017 20:34 )   PT: 12.8 sec;   INR: 1.17 ratio         PTT - ( 10 Aug 2017 20:34 )  PTT:29.4 sec    Telemetry: AF, rates 100-120

## 2017-08-11 NOTE — PROGRESS NOTE ADULT - ASSESSMENT
55 y/o morbidly obese M pt w/ PMHx acute liver failure w/out hepatic coma, A fib, ETOH abuse, hepatic encephalopathy, umbilical hernia presents to the ED sent in by doctor for new onset a fib. Pt notes worsening swelling in his lower extremities. Pt states he is a  and works 14 hours a day, is active on feet throughout the day. Pt was having cough and SOB and though that he had infection and was not getting better and found to have afib RVR  ,no  calf pain, recent travel or any other complaints at this time.  no fever, no urinary complaint,no bowel complaints  admittedwith AFIB RVR with h/o ETOH abuse , hepatic cirrhosis with ascites

## 2017-08-11 NOTE — PROGRESS NOTE ADULT - ASSESSMENT
The patient is a 56 year old male with a history of alcohol abuse, cirrhosis, paroxysmal atrial fibrillation who is admitted with rapid atrial fibrillation.    Plan:  - Echocardiogram findings noted. LV systolic function is at least moderately reduced. IVC is plethoric.  - Start furosemide 40 mg IV q12h  - Wean off diltiazem drip  - Start metoprolol tartrate 25 mg q6h. Will eventually need to be transitioned to succinate for heart failure  - Will need ACEI/ARB prior to discharge  - Given new finding of systolic heart failure, stroke risk increased from AF standpoint. Start apixaban 5 mg bid and discontinue aspirin.  - HbA1c, TSH pending  - Although BNP not significantly elevated, may be falsely low due to obesity  - Etiology of heart failure unclear, although differential includes tachycardia induced CM, alcoholic CM, or CAD. Will need to be re-imaged as outpatient on medical therapy, and if EF remains low, may need cardiac catheterization for further work-up.

## 2017-08-12 DIAGNOSIS — I34.0 NONRHEUMATIC MITRAL (VALVE) INSUFFICIENCY: ICD-10-CM

## 2017-08-12 DIAGNOSIS — E11.9 TYPE 2 DIABETES MELLITUS WITHOUT COMPLICATIONS: ICD-10-CM

## 2017-08-12 DIAGNOSIS — E66.01 MORBID (SEVERE) OBESITY DUE TO EXCESS CALORIES: ICD-10-CM

## 2017-08-12 LAB
ALBUMIN SERPL ELPH-MCNC: 3.4 G/DL — SIGNIFICANT CHANGE UP (ref 3.3–5)
ALP SERPL-CCNC: 53 U/L — SIGNIFICANT CHANGE UP (ref 30–120)
ALT FLD-CCNC: 30 U/L DA — SIGNIFICANT CHANGE UP (ref 10–60)
ANION GAP SERPL CALC-SCNC: 14 MMOL/L — SIGNIFICANT CHANGE UP (ref 5–17)
AST SERPL-CCNC: 37 U/L — SIGNIFICANT CHANGE UP (ref 10–40)
BILIRUB SERPL-MCNC: 0.6 MG/DL — SIGNIFICANT CHANGE UP (ref 0.2–1.2)
BUN SERPL-MCNC: 17 MG/DL — SIGNIFICANT CHANGE UP (ref 7–23)
CALCIUM SERPL-MCNC: 8.6 MG/DL — SIGNIFICANT CHANGE UP (ref 8.4–10.5)
CHLORIDE SERPL-SCNC: 103 MMOL/L — SIGNIFICANT CHANGE UP (ref 96–108)
CO2 SERPL-SCNC: 20 MMOL/L — LOW (ref 22–31)
CREAT SERPL-MCNC: 0.73 MG/DL — SIGNIFICANT CHANGE UP (ref 0.5–1.3)
CULTURE RESULTS: NO GROWTH — SIGNIFICANT CHANGE UP
GLUCOSE SERPL-MCNC: 100 MG/DL — HIGH (ref 70–99)
MAGNESIUM SERPL-MCNC: 1.4 MG/DL — LOW (ref 1.6–2.6)
POTASSIUM SERPL-MCNC: 4.4 MMOL/L — SIGNIFICANT CHANGE UP (ref 3.5–5.3)
POTASSIUM SERPL-SCNC: 4.4 MMOL/L — SIGNIFICANT CHANGE UP (ref 3.5–5.3)
PROT SERPL-MCNC: 7 G/DL — SIGNIFICANT CHANGE UP (ref 6–8.3)
SODIUM SERPL-SCNC: 137 MMOL/L — SIGNIFICANT CHANGE UP (ref 135–145)
SPECIMEN SOURCE: SIGNIFICANT CHANGE UP

## 2017-08-12 RX ORDER — ACETAMINOPHEN 500 MG
650 TABLET ORAL EVERY 6 HOURS
Qty: 0 | Refills: 0 | Status: DISCONTINUED | OUTPATIENT
Start: 2017-08-12 | End: 2017-08-14

## 2017-08-12 RX ORDER — MAGNESIUM SULFATE 500 MG/ML
1 VIAL (ML) INJECTION ONCE
Qty: 0 | Refills: 0 | Status: COMPLETED | OUTPATIENT
Start: 2017-08-12 | End: 2017-08-12

## 2017-08-12 RX ORDER — DIGOXIN 250 MCG
0.25 TABLET ORAL EVERY 6 HOURS
Qty: 0 | Refills: 0 | Status: COMPLETED | OUTPATIENT
Start: 2017-08-12 | End: 2017-08-13

## 2017-08-12 RX ORDER — DIGOXIN 250 MCG
0.25 TABLET ORAL DAILY
Qty: 0 | Refills: 0 | Status: DISCONTINUED | OUTPATIENT
Start: 2017-08-14 | End: 2017-08-14

## 2017-08-12 RX ADMIN — Medication 25 MILLIGRAM(S): at 17:12

## 2017-08-12 RX ADMIN — Medication 100 MILLIGRAM(S): at 11:53

## 2017-08-12 RX ADMIN — Medication 25 MILLIGRAM(S): at 06:07

## 2017-08-12 RX ADMIN — Medication 6 MILLIGRAM(S): at 22:04

## 2017-08-12 RX ADMIN — Medication 0.25 MILLIGRAM(S): at 23:34

## 2017-08-12 RX ADMIN — Medication 650 MILLIGRAM(S): at 22:00

## 2017-08-12 RX ADMIN — APIXABAN 5 MILLIGRAM(S): 2.5 TABLET, FILM COATED ORAL at 06:07

## 2017-08-12 RX ADMIN — Medication 0.25 MILLIGRAM(S): at 17:10

## 2017-08-12 RX ADMIN — Medication 650 MILLIGRAM(S): at 00:10

## 2017-08-12 RX ADMIN — Medication 100 GRAM(S): at 10:57

## 2017-08-12 RX ADMIN — PANTOPRAZOLE SODIUM 40 MILLIGRAM(S): 20 TABLET, DELAYED RELEASE ORAL at 06:07

## 2017-08-12 RX ADMIN — Medication 40 MILLIGRAM(S): at 09:52

## 2017-08-12 RX ADMIN — Medication 1 MILLIGRAM(S): at 11:53

## 2017-08-12 RX ADMIN — Medication 1 TABLET(S): at 11:53

## 2017-08-12 RX ADMIN — BUDESONIDE AND FORMOTEROL FUMARATE DIHYDRATE 2 PUFF(S): 160; 4.5 AEROSOL RESPIRATORY (INHALATION) at 18:34

## 2017-08-12 RX ADMIN — Medication 40 MILLIGRAM(S): at 21:02

## 2017-08-12 RX ADMIN — Medication 25 MILLIGRAM(S): at 11:55

## 2017-08-12 RX ADMIN — Medication 650 MILLIGRAM(S): at 21:01

## 2017-08-12 RX ADMIN — APIXABAN 5 MILLIGRAM(S): 2.5 TABLET, FILM COATED ORAL at 17:12

## 2017-08-12 NOTE — PROGRESS NOTE ADULT - ASSESSMENT
The patient is a 56 year old male with a history of alcohol abuse, cirrhosis, paroxysmal atrial fibrillation who is admitted with rapid atrial fibrillation.  Also recently stopped smoking.     8/12/17 Feeling better.  No significant complaints offered.  On C-PAP.  Monitor compatible with A-Fib.    Plan:  - Echocardiogram findings noted. LV systolic function is at least moderately reduced. IVC is plethoric.  - Continue furosemide 40 mg IV q12h and reassess in am.  - I&O.  - Continue metoprolol tartrate 25 mg q6h. Will eventually need to be transitioned to succinate for heart failure  - Will not start ACEI/ARB yet due to borderline blood pressure.  - Given new finding of systolic heart failure, stroke risk increased from AF standpoint. Start apixaban 5 mg bid and discontinue aspirin.  - Restrict water and salt.  - Lose weight.  - Although BNP not significantly elevated, may be falsely low due to obesity  - Etiology of heart failure unclear, although differential includes tachycardia induced CM, alcoholic CM, or CAD. Will need to be re-imaged as outpatient on medical therapy,       and if EF remains low, may need cardiac catheterization for further work-up. The patient is a 56 year old male with a history of alcohol abuse, cirrhosis, paroxysmal atrial fibrillation who is admitted with rapid atrial fibrillation.  Also recently stopped smoking.     8/12/17 Feeling better.  No significant complaints offered.  On C-PAP.  Monitor compatible with A-Fib.    Plan:  - Echocardiogram findings noted. LV systolic function is at least moderately reduced. IVC is plethoric.  - Continue furosemide 40 mg IV q12h and reassess in am.  - I&O.  - Continue metoprolol tartrate 25 mg q6h. Will eventually need to be transitioned to succinate for heart failure  - Will not start ACEI/ARB yet due to borderline blood pressure.  - Given new finding of systolic heart failure, stroke risk increased from AF standpoint. Start apixaban 5 mg bid and discontinue aspirin.  - Restrict water and salt.  - Lose weight.  - Although BNP not significantly elevated, may be falsely low due to obesity  - Etiology of heart failure unclear, although differential includes tachycardia induced CM, alcoholic CM, or CAD. Will need to be re-imaged as outpatient on medical therapy,       and if EF remains low, may need cardiac catheterization for further work-up.    ADDENDUM:  Digoxin 0.25 mg PO q6h x4 doses started, then 0.25 mg daily

## 2017-08-12 NOTE — PROGRESS NOTE ADULT - ASSESSMENT
Morbidly obese pt. with paroxysmal Atrial Fib, now with less rapid rate.  Underlying COPD/Asthma, Sleep apnea. Probable CHF, most likely right heart failure and ? Cirrhosis--mild increased ammonia.  Improved. Significant MR, Lv dysfunction Morbidly obese pt. with paroxysmal Atrial Fib, now with less rapid rate.  Underlying COPD/Asthma, Sleep apnea. Probable CHF, most likely right heart failure and ? Cirrhosis--mild increased ammonia.  Improved. Significant MR, Lv dysfunction  New onset DM

## 2017-08-12 NOTE — PROGRESS NOTE ADULT - PROBLEM SELECTOR PROBLEM 9
Congestive heart failure, unspecified congestive heart failure chronicity, unspecified congestive heart failure type Mitral regurgitation

## 2017-08-12 NOTE — PROGRESS NOTE ADULT - PROBLEM SELECTOR PROBLEM 5
Cirrhosis of liver with ascites, unspecified hepatic cirrhosis type COPD (chronic obstructive pulmonary disease)

## 2017-08-12 NOTE — PROGRESS NOTE ADULT - PROBLEM SELECTOR PROBLEM 8
Sleep apnea Congestive heart failure, unspecified congestive heart failure chronicity, unspecified congestive heart failure type

## 2017-08-12 NOTE — PROGRESS NOTE ADULT - SUBJECTIVE AND OBJECTIVE BOX
Patient is a 56y old  Male who presents with a chief complaint of sent pt from 's office for a-fib (11 Aug 2017 01:17)      INTERVAL HPI/OVERNIGHT EVENTS:no new complaint    MEDICATIONS  (STANDING):  thiamine 100 milliGRAM(s) Oral daily  folic acid 1 milliGRAM(s) Oral daily  multivitamin 1 Tablet(s) Oral daily  pantoprazole    Tablet 40 milliGRAM(s) Oral before breakfast  buDESOnide 160 MICROgram(s)/formoterol 4.5 MICROgram(s) Inhaler 2 Puff(s) Inhalation two times a day  furosemide   Injectable 40 milliGRAM(s) IV Push every 12 hours  apixaban 5 milliGRAM(s) Oral every 12 hours  metoprolol 25 milliGRAM(s) Oral every 6 hours    MEDICATIONS  (PRN):  melatonin 6 milliGRAM(s) Oral at bedtime PRN Insomnia  acetaminophen   Tablet. 650 milliGRAM(s) Oral every 6 hours PRN Moderate Pain (4 - 6)      Allergies    No Known Allergies    Intolerances          Vital Signs Last 24 Hrs  T(C): 36.3 (12 Aug 2017 10:24), Max: 37 (11 Aug 2017 20:02)  T(F): 97.4 (12 Aug 2017 10:24), Max: 98.6 (11 Aug 2017 20:02)  HR: 106 (12 Aug 2017 10:24) (94 - 122)  BP: 106/74 (12 Aug 2017 10:24) (101/88 - 132/84)  BP(mean): 102 (12 Aug 2017 08:00) (77 - 108)  RR: 16 (12 Aug 2017 10:24) (16 - 24)  SpO2: 92% (12 Aug 2017 10:24) (90% - 97%)    LABS:                        12.7   12.5  )-----------( 178      ( 11 Aug 2017 06:10 )             40.4     08-12    137  |  103  |  17  ----------------------------<  100<H>  4.4   |  20<L>  |  0.73    Ca    8.6      12 Aug 2017 06:56  Phos  4.1     08-11  Mg     1.4     08-12    TPro  7.0  /  Alb  3.4  /  TBili  0.6  /  DBili  x   /  AST  37  /  ALT  30  /  AlkPhos  53  08-    PT/INR - ( 10 Aug 2017 20:34 )   PT: 12.8 sec;   INR: 1.17 ratio         PTT - ( 10 Aug 2017 20:34 )  PTT:29.4 sec  Urinalysis Basic - ( 11 Aug 2017 03:44 )    Color: Yellow / Appearance: Clear / S.015 / pH: x  Gluc: x / Ketone: Negative  / Bili: Negative / Urobili: Negative mg/dL   Blood: x / Protein: 30 mg/dL / Nitrite: Negative   Leuk Esterase: Negative / RBC: 25-50 /HPF / WBC 0-2   Sq Epi: x / Non Sq Epi: x / Bacteria: x      CAPILLARY BLOOD GLUCOSE               @ 07:01  -  12 @ 07:00  --------------------------------------------------------  IN: 40 mL / OUT: 5775 mL / NET: -5735 mL          RADIOLOGY & ADDITIONAL TESTS:    Imaging Personally Reviewed:  [x ] YES  [ ] NO    Consultant(s) Notes Reviewed:  [ x] YES  [ ] NO    Care Discussed with Consultants/Other Providers [x ] YES  [ ] NO

## 2017-08-12 NOTE — PROGRESS NOTE ADULT - SUBJECTIVE AND OBJECTIVE BOX
Patient is a 56y Male with a known history of :  Morbid obesity, unspecified obesity type (E66.01)  Diabetes (E11.9)  Mitral regurgitation (I34.0)  Uncomplicated asthma, unspecified asthma severity (J45.909)  Sleep apnea, unspecified type (G47.30)  Chronic obstructive pulmonary disease, unspecified COPD type (J44.9)  Congestive heart failure, unspecified congestive heart failure chronicity, unspecified congestive heart failure type (I50.9)  Sleep apnea (G47.30)  Asthma (J45.909)  COPD (chronic obstructive pulmonary disease) (J44.9)  GERD (gastroesophageal reflux disease) (K21.9)  Morbid obesity with BMI of 50.0-59.9, adult (E66.01)  Alcohol abuse (F10.10)  Obesity (BMI 35.0-39.9 without comorbidity) (E66.9)  Cirrhosis of liver with ascites, unspecified hepatic cirrhosis type (K74.60)  Atrial fibrillation with RVR (I48.91)    HPI:  55 y/o morbidly obese M pt w/ PMHx acute liver failure w/out hepatic coma, A fib, ETOH abuse, hepatic encephalopathy, umbilical hernia presents to the ED sent in by doctor for new onset a fib. Pt notes worsening swelling in his lower extremities. Pt states he is a  and works 14 hours a day, is active on feet throughout the day. Pt was having cough and SOB and though that he had infection and was not getting better and found to have afib RVR  ,no  calf pain, recent travel or any other complaints at this time. no fever, no urinary complaint,no bowel complaints (10 Aug 2017 23:20)      REVIEW OF SYSTEMS:    CONSTITUTIONAL: No fever, weight loss, or fatigue  EYES: No eye pain, visual disturbances, or discharge  ENMT:  No difficulty hearing, tinnitus, vertigo; No sinus or throat pain  NECK: No pain or stiffness  BREASTS: No pain, masses, or nipple discharge  RESPIRATORY: No cough, wheezing, chills or hemoptysis; No shortness of breath  CARDIOVASCULAR: No chest pain, palpitations, dizziness, or leg swelling  GASTROINTESTINAL: No abdominal or epigastric pain. No nausea, vomiting, or hematemesis; No diarrhea or constipation. No melena or hematochezia.  GENITOURINARY: No dysuria, frequency, hematuria, or incontinence  NEUROLOGICAL: No headaches, memory loss, loss of strength, numbness, or tremors  SKIN: No itching, burning, rashes, or lesions   LYMPH NODES: No enlarged glands  ENDOCRINE: No heat or cold intolerance; No hair loss  MUSCULOSKELETAL: No joint pain or swelling; No muscle, back, or extremity pain  PSYCHIATRIC: No depression, anxiety, mood swings, or difficulty sleeping  HEME/LYMPH: No easy bruising, or bleeding gums  ALLERGY AND IMMUNOLOGIC: No hives or eczema    MEDICATIONS  (STANDING):  thiamine 100 milliGRAM(s) Oral daily  folic acid 1 milliGRAM(s) Oral daily  multivitamin 1 Tablet(s) Oral daily  pantoprazole    Tablet 40 milliGRAM(s) Oral before breakfast  buDESOnide 160 MICROgram(s)/formoterol 4.5 MICROgram(s) Inhaler 2 Puff(s) Inhalation two times a day  furosemide   Injectable 40 milliGRAM(s) IV Push every 12 hours  apixaban 5 milliGRAM(s) Oral every 12 hours  metoprolol 25 milliGRAM(s) Oral every 6 hours    MEDICATIONS  (PRN):  melatonin 6 milliGRAM(s) Oral at bedtime PRN Insomnia  acetaminophen   Tablet. 650 milliGRAM(s) Oral every 6 hours PRN Moderate Pain (4 - 6)      ALLERGIES: No Known Allergies      FAMILY HISTORY:      Social history:  Alochol:   Smoking:   Drug Use:   Marital Status:     PHYSICAL EXAMINATION:  -----------------------------  T(C): 36.3 (08-12-17 @ 10:24), Max: 37 (08-11-17 @ 20:02)  HR: 106 (08-12-17 @ 10:24) (100 - 122)  BP: 106/74 (08-12-17 @ 10:24) (102/66 - 132/84)  RR: 16 (08-12-17 @ 10:24) (16 - 23)  SpO2: 92% (08-12-17 @ 10:24) (90% - 97%)  Wt(kg): --    08-11 @ 07:01  -  08-12 @ 07:00  --------------------------------------------------------  IN:    diltiazem Infusion: 40 mL  Total IN: 40 mL    OUT:    Voided: 5775 mL  Total OUT: 5775 mL    Total NET: -5735 mL            Constitutional: well developed, normal appearance, well groomed, well nourished, no deformities and no acute distress.   Eyes: the conjunctiva exhibited no abnormalities and the eyelids demonstrated no xanthelasmas.   HEENT: normal oral mucosa, no oral pallor and no oral cyanosis.   Neck: normal jugular venous A waves present, normal jugular venous V waves present and no jugular venous baker A waves.   Pulmonary: decreased breath sounds right base.   Cardiovascular: heart rate and rhythm were irreg/irreg, normal S1 and S2.   Musculoskeletal: the gait could not be assessed..   Extremities: B/L 3-4 Plus pitting edema with some erythema.  Skin: normal skin color and pigmentation, no rash, no venous stasis, no skin lesions, no skin ulcer and no xanthoma was observed.   Psychiatric: oriented to person, place, and time, the affect was normal, the mood was normal and not feeling anxious.     LABS:   --------  08-12    137  |  103  |  17  ----------------------------<  100<H>  4.4   |  20<L>  |  0.73    Ca    8.6      12 Aug 2017 06:56  Phos  4.1     08-11  Mg     1.4     08-12    TPro  7.0  /  Alb  3.4  /  TBili  0.6  /  DBili  x   /  AST  37  /  ALT  30  /  AlkPhos  53  08-12                         12.7   12.5  )-----------( 178      ( 11 Aug 2017 06:10 )             40.4     PT/INR - ( 10 Aug 2017 20:34 )   PT: 12.8 sec;   INR: 1.17 ratio         PTT - ( 10 Aug 2017 20:34 )  PTT:29.4 sec    08-11 @ 02:13 CPK total:--, CKMB --, Troponin I - .025 ng/mL  08-10 @ 20:34 CPK total:--, CKMB --, Troponin I - .043 ng/mL      I&O [id number]/[id number]      Radiology:    < from: CT Chest No Cont (08.11.17 @ 08:42) >    EXAM:  CT CHEST                                  PROCEDURE DATE:  08/11/2017          INTERPRETATION:  Single view chest without contrast    History emphysema, atrial fibrillation, pulmonary hypertension    There is a trace right pleural effusion. There is minimal dependent   atelectasis. There is no proximal pulmonary arterial enlargement or   peripheral pruning to suggest arterial hypertension. There is mild to   moderate cardiomegaly. The aorta is normal in caliber. There is no lobar   consolidation or cavitation. There is mild central vascular congestion.   There is trace perihepatic ascites.    IMPRESSION:    Findings suggesting mild CHF                  ISAC BONE M.D., ATTENDING RADIOLOGIST  This document has been electronicallysigned. Aug 11 2017  9:34AM                < end of copied text >    < from: US Transthoracic Echocardiogram w/Doppler Complete (08.10.17 @ 22:40) >    EXAM:  US TTE W DOPPLER COMPLETE                                  PROCEDURE DATE:  08/10/2017          INTERPRETATION:  Ordering Physician: ANDRES MINA [id number]    Indication: Atrial fibrillation    Technician: Kellee Pedraza    Study Quality: Technically difficult study with poor acoustic windows     Height: 5 feet 11 inches  Weight: 375 pounds  Blood Pressure: 117/81    MEASUREMENTS  IVS: 1.3 cm  PWT: 1.3 cm  LA: 4.5 cm  Aortic Root: 3.4 cm  LVIDd: 5.3 cm  LVIDs: 4.3 cm    LVEF: Approximately 40%    FINDINGS  Left Ventricle: Endocardium not well visualized. Very limited views of   the left ventricle suggest grossly moderate, global left ventricular   systolic dysfunction. LVEF estimated at 40%. Moderate, concentric left   ventricular hypertrophy.  Right Ventricle: Right ventricle not well visualized.  Left Atrium: Moderate left atrial enlargement.  Right Atrium: Normal right atrium.  Mitral Valve: Normal mitral valve. Mild to moderate mitral regurgitation.  Aortic Valve: Aorticvalve not well visualized; grossly normal.  Tricuspid Valve: Tricuspid valve not well visualized. Mild tricuspid   regurgitation. Pulmonary artery systolic pressure estimated at 28 mmHg,   assuming a right atrial pressure of 8 mmHg, which is normal.  Pulmonic Valve: Pulmonic valve not visualized.  Pericardium/Pleura: No pericardial effusion.      CONCLUSIONS:  1. Technically difficult and limited study. Patient was in atrial   fibrillation with rapid ventricular response during imaging.  2. Endocardium not well visualized. Very limited views of the left   ventricle suggest grossly moderate, global left ventricular systolic   dysfunction. LVEF estimated at 40%.  Moderate, concentric left   ventricular hypertrophy.  3. Moderate left atrial enlargement.  4. Mild to moderate mitral regurgitation.                  ROSE RIZZO M.D., ATTENDING CARDIOLOGIST  This document has been electronically signed. Aug 11 2017  7:46AM                < end of copied text >

## 2017-08-12 NOTE — PROGRESS NOTE ADULT - SUBJECTIVE AND OBJECTIVE BOX
PULMONARY/CRITICAL CARE      INTERVAL HPI/OVERNIGHT EVENTS:  Pt improved, less sob. Ambulating. HR better.  56y MaleHPI:  55 y/o morbidly obese M pt w/ PMHx acute liver failure w/out hepatic coma, A fib, ETOH abuse, hepatic encephalopathy, umbilical hernia presents to the ED sent in by doctor for new onset a fib. Pt notes worsening swelling in his lower extremities. Pt states he is a  and works 14 hours a day, is active on feet throughout the day. Pt was having cough and SOB and though that he had infection and was not getting better and found to have afib RVR  ,no  calf pain, recent travel or any other complaints at this time. no fever, no urinary complaint,no bowel complaints (10 Aug 2017 23:20)        PAST MEDICAL & SURGICAL HISTORY:  Atrial fibrillation, unspecified type  Hepatic encephalopathy  Acute liver failure without hepatic coma  Umbilical hernia  Hernia of abdominal cavity  Alcohol abuse  History of appendectomy  Status post small bowel resection  History of hernia repair  H/O umbilical hernia repair        ICU Vital Signs Last 24 Hrs  T(C): 36.7 (12 Aug 2017 04:11), Max: 37 (11 Aug 2017 20:02)  T(F): 98.1 (12 Aug 2017 04:11), Max: 98.6 (11 Aug 2017 20:02)  HR: 108 (12 Aug 2017 06:00) (94 - 122)  BP: 107/72 (12 Aug 2017 06:00) (101/88 - 135/97)  BP(mean): 84 (12 Aug 2017 06:00) (77 - 109)  ABP: --  ABP(mean): --  RR: 16 (12 Aug 2017 06:00) (16 - 26)  SpO2: 90% (12 Aug 2017 06:00) (90% - 97%)    Qtts:     I&O's Summary    11 Aug 2017 07:01  -  12 Aug 2017 07:00  --------------------------------------------------------  IN: 40 mL / OUT: 5775 mL / NET: -5735 mL            REVIEW OF SYSTEMS:    CONSTITUTIONAL: No fever, weight loss, or fatigue  EYES: No eye pain, visual disturbances, or discharge  ENMT:  No difficulty hearing, tinnitus, vertigo; No sinus or throat pain  NECK: No pain or stiffness  BREASTS: No pain, masses, or nipple discharge  RESPIRATORY: No cough, wheezing, chills or hemoptysis; Improved shortness of breath  CARDIOVASCULAR: No chest pain, palpitations, dizziness, or leg swelling  GASTROINTESTINAL: No abdominal or epigastric pain. No nausea, vomiting, or hematemesis; No diarrhea or constipation. No melena or hematochezia.  GENITOURINARY: No dysuria, frequency, hematuria, or incontinence  NEUROLOGICAL: No headaches, memory loss, loss of strength, numbness, or tremors  SKIN: No itching, burning, rashes, or lesions   LYMPH NODES: No enlarged glands  ENDOCRINE: No heat or cold intolerance; No hair loss  MUSCULOSKELETAL: No joint pain or swelling; No muscle, back, or extremity pain, no calf tenderness  Edema  PSYCHIATRIC: No depression, anxiety, mood swings, or difficulty sleeping  HEME/LYMPH: No easy bruising, or bleeding gums  ALLERGY AND IMMUNOLOGIC: No hives or eczema      PHYSICAL EXAM:    GENERAL: NAD, well-groomed, well-developed, NAD  HEAD:  Atraumatic, Normocephalic  EYES: EOMI, PERRLA, conjunctiva and sclera clear  ENMT: No tonsillar erythema, exudates, or enlargement; Moist mucous membranes, Good dentition, No lesions  NECK: Supple, No JVD, Normal thyroid  NERVOUS SYSTEM:  Alert & Oriented X3, Good concentration; Motor Strength 5/5 B/L upper and lower extremities  CHEST/LUNG: Clear to percussion bilaterally; No rales, rhonchi, wheezing, or rubs Decreased bs, decreased vf.  HEART: Regular rate and rhythm; No murmurs, rubs, or gallops  ABDOMEN: Soft, Nontender, Nondistended; Bowel sounds present  Obese, anasarcic  EXTREMITIES:  2+ Peripheral Pulses, No clubbing, cyanosis, 3 plus pedal edema  LYMPH: No lymphadenopathy noted  SKIN: No rashes or lesions        LABS:                        12.7   12.5  )-----------( 178      ( 11 Aug 2017 06:10 )             40.4     08-    139  |  104  |  17  ----------------------------<  116<H>  3.5   |  27  |  0.70    Ca    8.6      11 Aug 2017 06:10  Phos  4.1     08-  Mg     1.6     08-11    TPro  7.0  /  Alb  3.6  /  TBili  0.5  /  DBili  x   /  AST  25  /  ALT  37  /  AlkPhos  53  08-11    PT/INR - ( 10 Aug 2017 20:34 )   PT: 12.8 sec;   INR: 1.17 ratio         PTT - ( 10 Aug 2017 20:34 )  PTT:29.4 sec  Urinalysis Basic - ( 11 Aug 2017 03:44 )    Color: Yellow / Appearance: Clear / S.015 / pH: x  Gluc: x / Ketone: Negative  / Bili: Negative / Urobili: Negative mg/dL   Blood: x / Protein: 30 mg/dL / Nitrite: Negative   Leuk Esterase: Negative / RBC: 25-50 /HPF / WBC 0-2   Sq Epi: x / Non Sq Epi: x / Bacteria: x        Ct chest--chf    RADIOLOGY & ADDITIONAL STUDIES:  EXAM: US TTE W DOPPLER COMPLETE            PROCEDURE DATE: 08/10/2017        INTERPRETATION: Ordering Physician: ANDRES MINA [id number]    Indication: Atrial fibrillation    Technician: Kellee Pedraza    Study Quality: Technically difficult study with poor acoustic windows    Height: 5 feet 11 inches  Weight: 375 pounds  Blood Pressure: 117/81    MEASUREMENTS  IVS: 1.3 cm  PWT: 1.3 cm  LA: 4.5 cm  Aortic Root: 3.4 cm  LVIDd: 5.3 cm  LVIDs: 4.3 cm    LVEF: Approximately 40%    FINDINGS  Left Ventricle: Endocardium not well visualized. Very limited views of the  left ventricle suggest grossly moderate, global left ventricular systolic  dysfunction. LVEF estimated at 40%. Moderate, concentric left ventricular  hypertrophy.  Right Ventricle: Right ventricle not well visualized.  Left Atrium: Moderate left atrial enlargement.  Right Atrium: Normal right atrium.  Mitral Valve: Normal mitral valve. Mild to moderate mitral regurgitation.  Aortic Valve: Aortic valve not well visualized; grossly normal.  Tricuspid Valve: Tricuspid valve not well visualized. Mild tricuspid  regurgitation. Pulmonary artery systolic pressure estimated at 28 mmHg,  assuming a right atrial pressure of 8 mmHg, which is normal.  Pulmonic Valve: Pulmonic valve not visualized.  Pericardium/Pleura: No pericardial effusion.      CONCLUSIONS:  1. Technically difficult and limited study. Patient was in atrial  fibrillation with rapid ventricular response during imaging.  2. Endocardium not well visualized. Very limited views of the left ventricle  suggest grossly moderate, global left ventricular systolic dysfunction. LVEF  estimated at 40%. Moderate, concentric left ventricular hypertrophy.  3. Moderate left atrial enlargement.  4. Mild to moderate mitral regurgitation.                  ROSE RIZZO M.D., ATTENDING CARDIOLOGIST  This document has been electronically signed. Aug 11 2017        CRITICAL CARE TIME SPENT: PULMONARY/CRITICAL CARE      INTERVAL HPI/OVERNIGHT EVENTS:  Pt improved, less sob. Ambulating. HR better.  56y MaleHPI:  55 y/o morbidly obese M pt w/ PMHx acute liver failure w/out hepatic coma, A fib, ETOH abuse, hepatic encephalopathy, umbilical hernia presents to the ED sent in by doctor for new onset a fib. Pt notes worsening swelling in his lower extremities. Pt states he is a  and works 14 hours a day, is active on feet throughout the day. Pt was having cough and SOB and though that he had infection and was not getting better and found to have afib RVR  ,no  calf pain, recent travel or any other complaints at this time. no fever, no urinary complaint,no bowel complaints (10 Aug 2017 23:20)        PAST MEDICAL & SURGICAL HISTORY:  Atrial fibrillation, unspecified type  Hepatic encephalopathy  Acute liver failure without hepatic coma  Umbilical hernia  Hernia of abdominal cavity  Alcohol abuse  History of appendectomy  Status post small bowel resection  History of hernia repair  H/O umbilical hernia repair        ICU Vital Signs Last 24 Hrs  T(C): 36.7 (12 Aug 2017 04:11), Max: 37 (11 Aug 2017 20:02)  T(F): 98.1 (12 Aug 2017 04:11), Max: 98.6 (11 Aug 2017 20:02)  HR: 108 (12 Aug 2017 06:00) (94 - 122)  BP: 107/72 (12 Aug 2017 06:00) (101/88 - 135/97)  BP(mean): 84 (12 Aug 2017 06:00) (77 - 109)  ABP: --  ABP(mean): --  RR: 16 (12 Aug 2017 06:00) (16 - 26)  SpO2: 90% (12 Aug 2017 06:00) (90% - 97%)    Qtts:     I&O's Summary    11 Aug 2017 07:01  -  12 Aug 2017 07:00  --------------------------------------------------------  IN: 40 mL / OUT: 5775 mL / NET: -5735 mL            REVIEW OF SYSTEMS:    CONSTITUTIONAL: No fever, weight loss, or fatigue  EYES: No eye pain, visual disturbances, or discharge  ENMT:  No difficulty hearing, tinnitus, vertigo; No sinus or throat pain  NECK: No pain or stiffness  BREASTS: No pain, masses, or nipple discharge  RESPIRATORY: No cough, wheezing, chills or hemoptysis; Improved shortness of breath  CARDIOVASCULAR: No chest pain, palpitations, dizziness, or leg swelling  GASTROINTESTINAL: No abdominal or epigastric pain. No nausea, vomiting, or hematemesis; No diarrhea or constipation. No melena or hematochezia.  GENITOURINARY: No dysuria, frequency, hematuria, or incontinence  NEUROLOGICAL: No headaches, memory loss, loss of strength, numbness, or tremors  SKIN: No itching, burning, rashes, or lesions   LYMPH NODES: No enlarged glands  ENDOCRINE: No heat or cold intolerance; No hair loss  MUSCULOSKELETAL: No joint pain or swelling; No muscle, back, or extremity pain, no calf tenderness  Edema  PSYCHIATRIC: No depression, anxiety, mood swings, or difficulty sleeping  HEME/LYMPH: No easy bruising, or bleeding gums  ALLERGY AND IMMUNOLOGIC: No hives or eczema      PHYSICAL EXAM:    GENERAL: NAD, well-groomed, well-developed, NAD  HEAD:  Atraumatic, Normocephalic  EYES: EOMI, PERRLA, conjunctiva and sclera clear  ENMT: No tonsillar erythema, exudates, or enlargement; Moist mucous membranes, Good dentition, No lesions  NECK: Supple, No JVD, Normal thyroid  NERVOUS SYSTEM:  Alert & Oriented X3, Good concentration; Motor Strength 5/5 B/L upper and lower extremities  CHEST/LUNG: Clear to percussion bilaterally; No rales, rhonchi, wheezing, or rubs Decreased bs, decreased vf.  HEART: Regular rate and rhythm; No murmurs, rubs, or gallops  ABDOMEN: Soft, Nontender, Nondistended; Bowel sounds present  Obese, anasarcic  EXTREMITIES:  2+ Peripheral Pulses, No clubbing, cyanosis, 3 plus pedal edema  LYMPH: No lymphadenopathy noted  SKIN: No rashes or lesions        LABS:                        12.7   12.5  )-----------( 178      ( 11 Aug 2017 06:10 )             40.4     08-    139  |  104  |  17  ----------------------------<  116<H>  3.5   |  27  |  0.70    Ca    8.6      11 Aug 2017 06:10  Phos  4.1     08-11  Mg     1.6     08-11    TPro  7.0  /  Alb  3.6  /  TBili  0.5  /  DBili  x   /  AST  25  /  ALT  37  /  AlkPhos  53  08-11    PT/INR - ( 10 Aug 2017 20:34 )   PT: 12.8 sec;   INR: 1.17 ratio    Glycoh HGB hi     PTT - ( 10 Aug 2017 20:34 )  PTT:29.4 sec  Urinalysis Basic - ( 11 Aug 2017 03:44 )    Color: Yellow / Appearance: Clear / S.015 / pH: x  Gluc: x / Ketone: Negative  / Bili: Negative / Urobili: Negative mg/dL   Blood: x / Protein: 30 mg/dL / Nitrite: Negative   Leuk Esterase: Negative / RBC: 25-50 /HPF / WBC 0-2   Sq Epi: x / Non Sq Epi: x / Bacteria: x        Ct chest--chf    RADIOLOGY & ADDITIONAL STUDIES:  EXAM: US TTE W DOPPLER COMPLETE            PROCEDURE DATE: 08/10/2017        INTERPRETATION: Ordering Physician: ANDRES MINA [id number]    Indication: Atrial fibrillation    Technician: Kellee Pedraza    Study Quality: Technically difficult study with poor acoustic windows    Height: 5 feet 11 inches  Weight: 375 pounds  Blood Pressure: 117/81    MEASUREMENTS  IVS: 1.3 cm  PWT: 1.3 cm  LA: 4.5 cm  Aortic Root: 3.4 cm  LVIDd: 5.3 cm  LVIDs: 4.3 cm    LVEF: Approximately 40%    FINDINGS  Left Ventricle: Endocardium not well visualized. Very limited views of the  left ventricle suggest grossly moderate, global left ventricular systolic  dysfunction. LVEF estimated at 40%. Moderate, concentric left ventricular  hypertrophy.  Right Ventricle: Right ventricle not well visualized.  Left Atrium: Moderate left atrial enlargement.  Right Atrium: Normal right atrium.  Mitral Valve: Normal mitral valve. Mild to moderate mitral regurgitation.  Aortic Valve: Aortic valve not well visualized; grossly normal.  Tricuspid Valve: Tricuspid valve not well visualized. Mild tricuspid  regurgitation. Pulmonary artery systolic pressure estimated at 28 mmHg,  assuming a right atrial pressure of 8 mmHg, which is normal.  Pulmonic Valve: Pulmonic valve not visualized.  Pericardium/Pleura: No pericardial effusion.      CONCLUSIONS:  1. Technically difficult and limited study. Patient was in atrial  fibrillation with rapid ventricular response during imaging.  2. Endocardium not well visualized. Very limited views of the left ventricle  suggest grossly moderate, global left ventricular systolic dysfunction. LVEF  estimated at 40%. Moderate, concentric left ventricular hypertrophy.  3. Moderate left atrial enlargement.  4. Mild to moderate mitral regurgitation.                  ROSE RIZZO M.D., ATTENDING CARDIOLOGIST  This document has been electronically signed. Aug 11 2017        CRITICAL CARE TIME SPENT:

## 2017-08-13 DIAGNOSIS — E83.42 HYPOMAGNESEMIA: ICD-10-CM

## 2017-08-13 LAB
ALBUMIN SERPL ELPH-MCNC: 3.3 G/DL — SIGNIFICANT CHANGE UP (ref 3.3–5)
ALP SERPL-CCNC: 52 U/L — SIGNIFICANT CHANGE UP (ref 30–120)
ALT FLD-CCNC: 30 U/L DA — SIGNIFICANT CHANGE UP (ref 10–60)
ANION GAP SERPL CALC-SCNC: 7 MMOL/L — SIGNIFICANT CHANGE UP (ref 5–17)
AST SERPL-CCNC: 20 U/L — SIGNIFICANT CHANGE UP (ref 10–40)
BILIRUB SERPL-MCNC: 0.5 MG/DL — SIGNIFICANT CHANGE UP (ref 0.2–1.2)
BUN SERPL-MCNC: 19 MG/DL — SIGNIFICANT CHANGE UP (ref 7–23)
CALCIUM SERPL-MCNC: 8.7 MG/DL — SIGNIFICANT CHANGE UP (ref 8.4–10.5)
CHLORIDE SERPL-SCNC: 104 MMOL/L — SIGNIFICANT CHANGE UP (ref 96–108)
CO2 SERPL-SCNC: 30 MMOL/L — SIGNIFICANT CHANGE UP (ref 22–31)
CREAT SERPL-MCNC: 0.83 MG/DL — SIGNIFICANT CHANGE UP (ref 0.5–1.3)
GLUCOSE SERPL-MCNC: 120 MG/DL — HIGH (ref 70–99)
MAGNESIUM SERPL-MCNC: 1.4 MG/DL — LOW (ref 1.6–2.6)
POTASSIUM SERPL-MCNC: 3.5 MMOL/L — SIGNIFICANT CHANGE UP (ref 3.5–5.3)
POTASSIUM SERPL-SCNC: 3.5 MMOL/L — SIGNIFICANT CHANGE UP (ref 3.5–5.3)
PROT SERPL-MCNC: 6.7 G/DL — SIGNIFICANT CHANGE UP (ref 6–8.3)
SODIUM SERPL-SCNC: 141 MMOL/L — SIGNIFICANT CHANGE UP (ref 135–145)

## 2017-08-13 PROCEDURE — 71010: CPT | Mod: 26

## 2017-08-13 RX ORDER — POTASSIUM CHLORIDE 20 MEQ
40 PACKET (EA) ORAL ONCE
Qty: 0 | Refills: 0 | Status: COMPLETED | OUTPATIENT
Start: 2017-08-13 | End: 2017-08-13

## 2017-08-13 RX ORDER — MAGNESIUM OXIDE 400 MG ORAL TABLET 241.3 MG
400 TABLET ORAL
Qty: 0 | Refills: 0 | Status: DISCONTINUED | OUTPATIENT
Start: 2017-08-13 | End: 2017-08-14

## 2017-08-13 RX ORDER — METOPROLOL TARTRATE 50 MG
25 TABLET ORAL
Qty: 0 | Refills: 0 | Status: DISCONTINUED | OUTPATIENT
Start: 2017-08-13 | End: 2017-08-14

## 2017-08-13 RX ORDER — MAGNESIUM SULFATE 500 MG/ML
2 VIAL (ML) INJECTION ONCE
Qty: 0 | Refills: 0 | Status: COMPLETED | OUTPATIENT
Start: 2017-08-13 | End: 2017-08-13

## 2017-08-13 RX ORDER — METFORMIN HYDROCHLORIDE 850 MG/1
500 TABLET ORAL
Qty: 0 | Refills: 0 | Status: DISCONTINUED | OUTPATIENT
Start: 2017-08-13 | End: 2017-08-14

## 2017-08-13 RX ADMIN — Medication 0.25 MILLIGRAM(S): at 06:19

## 2017-08-13 RX ADMIN — Medication 40 MILLIGRAM(S): at 21:36

## 2017-08-13 RX ADMIN — BUDESONIDE AND FORMOTEROL FUMARATE DIHYDRATE 2 PUFF(S): 160; 4.5 AEROSOL RESPIRATORY (INHALATION) at 18:15

## 2017-08-13 RX ADMIN — APIXABAN 5 MILLIGRAM(S): 2.5 TABLET, FILM COATED ORAL at 17:00

## 2017-08-13 RX ADMIN — APIXABAN 5 MILLIGRAM(S): 2.5 TABLET, FILM COATED ORAL at 06:19

## 2017-08-13 RX ADMIN — Medication 1 TABLET(S): at 12:47

## 2017-08-13 RX ADMIN — BUDESONIDE AND FORMOTEROL FUMARATE DIHYDRATE 2 PUFF(S): 160; 4.5 AEROSOL RESPIRATORY (INHALATION) at 06:28

## 2017-08-13 RX ADMIN — Medication 1 MILLIGRAM(S): at 12:46

## 2017-08-13 RX ADMIN — Medication 40 MILLIGRAM(S): at 12:45

## 2017-08-13 RX ADMIN — Medication 650 MILLIGRAM(S): at 22:43

## 2017-08-13 RX ADMIN — Medication 25 MILLIGRAM(S): at 17:00

## 2017-08-13 RX ADMIN — METFORMIN HYDROCHLORIDE 500 MILLIGRAM(S): 850 TABLET ORAL at 17:00

## 2017-08-13 RX ADMIN — Medication 650 MILLIGRAM(S): at 23:15

## 2017-08-13 RX ADMIN — PANTOPRAZOLE SODIUM 40 MILLIGRAM(S): 20 TABLET, DELAYED RELEASE ORAL at 06:19

## 2017-08-13 RX ADMIN — Medication 50 GRAM(S): at 12:47

## 2017-08-13 RX ADMIN — Medication 6 MILLIGRAM(S): at 22:44

## 2017-08-13 RX ADMIN — Medication 40 MILLIEQUIVALENT(S): at 12:45

## 2017-08-13 RX ADMIN — Medication 100 MILLIGRAM(S): at 12:47

## 2017-08-13 RX ADMIN — MAGNESIUM OXIDE 400 MG ORAL TABLET 400 MILLIGRAM(S): 241.3 TABLET ORAL at 17:00

## 2017-08-13 RX ADMIN — MAGNESIUM OXIDE 400 MG ORAL TABLET 400 MILLIGRAM(S): 241.3 TABLET ORAL at 12:46

## 2017-08-13 NOTE — PROGRESS NOTE ADULT - ASSESSMENT
Morbidly obese pt. with paroxysmal Atrial Fib, now with less rapid rate.  Underlying COPD/Asthma, Sleep apnea. Probable CHF, most likely right heart failure and ? Cirrhosis--mild increased ammonia.  Improved. Significant MR, Lv dysfunction  New onset DM  Overall improved

## 2017-08-13 NOTE — PROGRESS NOTE ADULT - SUBJECTIVE AND OBJECTIVE BOX
Patient is a 56y Male with a known history of :  Morbid obesity, unspecified obesity type (E66.01)  Diabetes (E11.9)  Mitral regurgitation (I34.0)  Uncomplicated asthma, unspecified asthma severity (J45.909)  Sleep apnea, unspecified type (G47.30)  Chronic obstructive pulmonary disease, unspecified COPD type (J44.9)  Congestive heart failure, unspecified congestive heart failure chronicity, unspecified congestive heart failure type (I50.9)  Sleep apnea (G47.30)  Asthma (J45.909)  COPD (chronic obstructive pulmonary disease) (J44.9)  GERD (gastroesophageal reflux disease) (K21.9)  Morbid obesity with BMI of 50.0-59.9, adult (E66.01)  Alcohol abuse (F10.10)  Obesity (BMI 35.0-39.9 without comorbidity) (E66.9)  Cirrhosis of liver with ascites, unspecified hepatic cirrhosis type (K74.60)  Atrial fibrillation with RVR (I48.91)    HPI:  55 y/o morbidly obese M pt w/ PMHx acute liver failure w/out hepatic coma, A fib, ETOH abuse, hepatic encephalopathy, umbilical hernia presents to the ED sent in by doctor for new onset a fib. Pt notes worsening swelling in his lower extremities. Pt states he is a  and works 14 hours a day, is active on feet throughout the day. Pt was having cough and SOB and though that he had infection and was not getting better and found to have afib RVR  ,no  calf pain, recent travel or any other complaints at this time. no fever, no urinary complaint,no bowel complaints (10 Aug 2017 23:20)      REVIEW OF SYSTEMS:    CONSTITUTIONAL: No fever, weight loss, or fatigue  EYES: No eye pain, visual disturbances, or discharge  ENMT:  No difficulty hearing, tinnitus, vertigo; No sinus or throat pain  NECK: No pain or stiffness  BREASTS: No pain, masses, or nipple discharge  RESPIRATORY: No cough, wheezing, chills or hemoptysis; No shortness of breath  CARDIOVASCULAR: No chest pain, palpitations, dizziness, or leg swelling  GASTROINTESTINAL: No abdominal or epigastric pain. No nausea, vomiting, or hematemesis; No diarrhea or constipation. No melena or hematochezia.  GENITOURINARY: No dysuria, frequency, hematuria, or incontinence  NEUROLOGICAL: No headaches, memory loss, loss of strength, numbness, or tremors  SKIN: No itching, burning, rashes, or lesions   LYMPH NODES: No enlarged glands  ENDOCRINE: No heat or cold intolerance; No hair loss  MUSCULOSKELETAL: No joint pain or swelling; No muscle, back, or extremity pain  PSYCHIATRIC: No depression, anxiety, mood swings, or difficulty sleeping  HEME/LYMPH: No easy bruising, or bleeding gums  ALLERGY AND IMMUNOLOGIC: No hives or eczema    MEDICATIONS  (STANDING):  thiamine 100 milliGRAM(s) Oral daily  folic acid 1 milliGRAM(s) Oral daily  multivitamin 1 Tablet(s) Oral daily  pantoprazole    Tablet 40 milliGRAM(s) Oral before breakfast  buDESOnide 160 MICROgram(s)/formoterol 4.5 MICROgram(s) Inhaler 2 Puff(s) Inhalation two times a day  furosemide   Injectable 40 milliGRAM(s) IV Push every 12 hours  apixaban 5 milliGRAM(s) Oral every 12 hours  magnesium sulfate  IVPB 2 Gram(s) IV Intermittent once  metFORMIN 500 milliGRAM(s) Oral two times a day with meals  magnesium oxide 400 milliGRAM(s) Oral three times a day with meals  potassium chloride    Tablet ER 40 milliEquivalent(s) Oral once  metoprolol 25 milliGRAM(s) Oral two times a day    MEDICATIONS  (PRN):  melatonin 6 milliGRAM(s) Oral at bedtime PRN Insomnia  acetaminophen   Tablet. 650 milliGRAM(s) Oral every 6 hours PRN Moderate Pain (4 - 6)      ALLERGIES: No Known Allergies      FAMILY HISTORY:      Social history:  Alochol:   Smoking:   Drug Use:   Marital Status:     PHYSICAL EXAMINATION:  -----------------------------  T(C): 36.6 (08-13-17 @ 07:54), Max: 36.7 (08-12-17 @ 15:10)  HR: 101 (08-13-17 @ 07:54) (100 - 113)  BP: 92/54 (08-13-17 @ 07:54) (90/55 - 103/64)  RR: 14 (08-13-17 @ 07:54) (14 - 18)  SpO2: 93% (08-13-17 @ 07:54) (93% - 95%)  Wt(kg): --    08-12 @ 07:01  -  08-13 @ 07:00  --------------------------------------------------------  IN:  Total IN: 0 mL    OUT:    Voided: 450 mL  Total OUT: 450 mL    Total NET: -450 mL            Constitutional: well developed, normal appearance, well groomed, well nourished, no deformities and no acute distress.   Eyes: the conjunctiva exhibited no abnormalities and the eyelids demonstrated no xanthelasmas.   HEENT: normal oral mucosa, no oral pallor and no oral cyanosis.   Neck: normal jugular venous A waves present, normal jugular venous V waves present and no jugular venous baker A waves.   Pulmonary: decreased breath sounds right base.  Cardiovascular: heart rate and rhythm were irreg/irreg, normal S1 and S2 and no murmur, gallop, rub, heave or thrill are present.   Musculoskeletal: the gait could not be assessed..   Extremities: B/L 3-4 Plus pitting edema with erythema.  Skin: normal skin color and pigmentation, no rash, no venous stasis, no skin lesions, no skin ulcer and no xanthoma was observed.   Psychiatric: oriented to person, place, and time, the affect was normal, the mood was normal and not feeling anxious.     LABS:   --------  08-13    141  |  104  |  19  ----------------------------<  120<H>  3.5   |  30  |  0.83    Ca    8.7      13 Aug 2017 06:40  Mg     1.4     08-13    TPro  6.7  /  Alb  3.3  /  TBili  0.5  /  DBili  x   /  AST  20  /  ALT  30  /  AlkPhos  52  08-13                   Radiology:

## 2017-08-13 NOTE — CONSULT NOTE ADULT - SUBJECTIVE AND OBJECTIVE BOX
PULMONARY/CRITICAL CARE      INTERVAL HPI/OVERNIGHT EVENTS:Pt seen by me in office, found to be in AF. Now denies sob. On cardizem drip, hr around 120    56y MaleHPI:  55 y/o morbidly obese M pt w/ PMHx acute liver failure w/out hepatic coma, A fib, ETOH abuse, hepatic encephalopathy, umbilical hernia presents to the ED sent in by doctor for new onset a fib. Pt notes worsening swelling in his lower extremities. Pt states he is a  and works 14 hours a day, is active on feet throughout the day. Pt was having cough and SOB and though that he had infection and was not getting better and found to have afib RVR  ,no  calf pain, recent travel or any other complaints at this time. no fever, no urinary complaint,no bowel complaints (10 Aug 2017 23:20)        PAST MEDICAL & SURGICAL HISTORY:  Atrial fibrillation, unspecified type  Hepatic encephalopathy  Acute liver failure without hepatic coma  Umbilical hernia  Hernia of abdominal cavity  Alcohol abuse  History of appendectomy  Status post small bowel resection  History of hernia repair  H/O umbilical hernia repair  Asthma/copd?  Probable Sleep apnea    Family and Social Hx--alcoholic, smokes 1/2 ppd for 10 yrs.    ICU Vital Signs Last 24 Hrs  T(C): 36.7 (11 Aug 2017 04:58), Max: 36.9 (10 Aug 2017 23:27)  T(F): 98.1 (11 Aug 2017 04:58), Max: 98.4 (10 Aug 2017 23:27)  HR: 109 (11 Aug 2017 06:00) (106 - 142)  BP: 125/84 (11 Aug 2017 06:00) (100/78 - 143/100)  BP(mean): 98 (11 Aug 2017 06:00) (93 - 112)  ABP: --  ABP(mean): --  RR: 16 (11 Aug 2017 06:00) (14 - 27)  SpO2: 93% (11 Aug 2017 06:00) (92% - 98%)    Qtts:     I&O's Summary    10 Aug 2017 07:01  -  11 Aug 2017 06:38  --------------------------------------------------------  IN: 1065 mL / OUT: 500 mL / NET: 565 mL            REVIEW OF SYSTEMS:    CONSTITUTIONAL: No fever, weight loss, or fatigue  EYES: No eye pain, visual disturbances, or discharge  ENMT:  No difficulty hearing, tinnitus, vertigo; No sinus or throat pain  NECK: No pain or stiffness  BREASTS: No pain, masses, or nipple discharge  RESPIRATORY: No cough, wheezing, chills or hemoptysis; Progressive shortness of breath: snores: nocturnal awakening  CARDIOVASCULAR: No chest pain, palpitations, dizziness, or leg swelling  GASTROINTESTINAL: No abdominal or epigastric pain. No nausea, vomiting, or hematemesis; No diarrhea or constipation. No melena or hematochezia.  GENITOURINARY: No dysuria, frequency, hematuria, or incontinence  NEUROLOGICAL: No headaches, memory loss, loss of strength, numbness, or tremors  SKIN: No itching, burning, rashes, or lesions   LYMPH NODES: No enlarged glands  ENDOCRINE: No heat or cold intolerance; No hair loss  MUSCULOSKELETAL: No joint pain or swelling; No muscle, back, or extremity pain, no calf tenderness Increasing pedal edema and weight gain  PSYCHIATRIC: No depression, anxiety, mood swings, or difficulty sleeping  HEME/LYMPH: No easy bruising, or bleeding gums  ALLERGY AND IMMUNOLOGIC: No hives or eczema      PHYSICAL EXAM:    GENERAL: NAD, well-groomed, well-developed, NAD  HEAD:  Atraumatic, Normocephalic  EYES: EOMI, PERRLA, conjunctiva and sclera clear  ENMT: No tonsillar erythema, exudates, or enlargement; Moist mucous membranes, Good dentition, No lesions  NECK: Supple, No JVD, Normal thyroid  NERVOUS SYSTEM:  Alert & Oriented X3, Good concentration; Motor Strength 5/5 B/L upper and lower extremities  CHEST/LUNG: Clear to percussion bilaterally; No rales, rhonchi, wheezing, or rubs  HEART: Irregular rate and rhythm; No murmurs, rubs, or gallops  ABDOMEN: Soft, Nontender, Nondistended; Bowel sounds present  Obese/anasarcic  EXTREMITIES:  2+ Peripheral Pulses, No clubbing, cyanosis, or 3 plus leg edema  LYMPH: No lymphadenopathy noted  SKIN: No rashes or lesions        LABS:                        12.7   12.5  )-----------( 178      ( 11 Aug 2017 06:10 )             40.4     08-10    141  |  106  |  18  ----------------------------<  104<H>  3.8   |  25  |  0.88    Ca    8.6      10 Aug 2017 20:34  Phos  3.4     08-10  Mg     1.3     08-10    TPro  7.1  /  Alb  3.7  /  TBili  0.5  /  DBili  x   /  AST  27  /  ALT  38  /  AlkPhos  61  0810    PT/INR - ( 10 Aug 2017 20:34 )   PT: 12.8 sec;   INR: 1.17 ratio         PTT - ( 10 Aug 2017 20:34 )  PTT:29.4 sec  Urinalysis Basic - ( 11 Aug 2017 03:44 )    Color: Yellow / Appearance: Clear / S.015 / pH: x  Gluc: x / Ketone: Negative  / Bili: Negative / Urobili: Negative mg/dL   Blood: x / Protein: 30 mg/dL / Nitrite: Negative   Leuk Esterase: Negative / RBC: 25-50 /HPF / WBC 0-2   Sq Epi: x / Non Sq Epi: x / Bacteria: x            RADIOLOGY & ADDITIONAL STUDIES:  CXR nukd congestion      CRITICAL CARE TIME SPENT:
History of Present Illness: The patient is a 56 year old male with a history of alcohol abuse, cirrhosis, paroxysmal atrial fibrillation who was sent in for rapid atrial fibrillation. He notes over the past few weeks issues with shortness of breath, cough, wheeze. He was seen at urgent care twice for this, given albuterol and antibiotics. Today, went to see pulmonary who noted he was in rapid AF. He denies palpitations, chest pain, dizziness. Last episode of AF was one year ago during surgery; he was intubated during this and thus does not know if there are attributable symptoms.    Past Medical/Surgical History:  alcohol abuse, cirrhosis, paroxysmal atrial fibrillation    Medications:  MEDICATIONS  (STANDING):  sodium chloride 0.9% Bolus 500 milliLiter(s) IV Bolus every 15 minutes  diltiazem Injectable 10 milliGRAM(s) IV Push once  sodium chloride 0.9% Bolus 1000 milliLiter(s) IV Bolus once    MEDICATIONS  (PRN):      Family History: Non-contributory family history of premature cardiovascular atherosclerotic disease    Social History: No tobacco, alcohol or drug use    Review of Systems:  General: No fevers, chills, weight loss or gain  Skin: No rashes, color changes  Cardiovascular: No chest pain, orthopnea  Respiratory: No shortness of breath, cough  Gastrointestinal: No nausea, abdominal pain  Genitourinary: No incontinence, pain with urination  Musculoskeletal: No pain, swelling, decreased range of motion  Neurological: No headache, weakness  Psychiatric: No depression, anxiety  Endocrine: No weight loss or gain, increased thirst  All other systems are comprehensively negative.    Physical Exam:  Vitals:        Vital Signs Last 24 Hrs  T(C): 36.7 (10 Aug 2017 19:39), Max: 36.7 (10 Aug 2017 19:39)  T(F): 98 (10 Aug 2017 19:39), Max: 98 (10 Aug 2017 19:39)  HR: 117 (10 Aug 2017 21:01) (115 - 142)  BP: 117/81 (10 Aug 2017 21:01) (104/71 - 124/79)  BP(mean): --  RR: 18 (10 Aug 2017 21:01) (14 - 20)  SpO2: 97% (10 Aug 2017 21:01) (93% - 97%)  General: NAD  Neck: No JVD, no carotid bruit  Lungs: Trace wheezing  CV: Irregular, nl S1/S2, no M/R/G  Abdomen: S/NT/ND, +BS  Extremities: 3+ LE edema, no cyanosis    Labs:                        13.0   11.8  )-----------( 195      ( 10 Aug 2017 20:34 )             41.0     08-10    141  |  106  |  18  ----------------------------<  104<H>  3.8   |  25  |  0.88    Ca    8.6      10 Aug 2017 20:34    TPro  7.1  /  Alb  3.7  /  TBili  0.5  /  DBili  x   /  AST  27  /  ALT  38  /  AlkPhos  61  08-10    CARDIAC MARKERS ( 10 Aug 2017 20:34 )  .043 ng/mL / x     / x     / x     / x          PT/INR - ( 10 Aug 2017 20:34 )   PT: 12.8 sec;   INR: 1.17 ratio         PTT - ( 10 Aug 2017 20:34 )  PTT:29.4 sec    ECG: AF, LAD, nonspecific ST abnormality
Patient is a 56y old  Male who presents with a chief complaint of sent pt from 's office for a-fib (11 Aug 2017 01:17)      Reason For Consult:     HPI:  57 y/o morbidly obese M pt w/ PMHx acute liver failure w/out hepatic coma, A fib, ETOH abuse, hepatic encephalopathy, umbilical hernia presents to the ED sent in by doctor for new onset a fib. Pt notes worsening swelling in his lower extremities. Pt states he is a  and works 14 hours a day, is active on feet throughout the day. Pt was having cough and SOB and though that he had infection and was not getting better and found to have afib RVR  ,no  calf pain, recent travel or any other complaints at this time. no fever, no urinary complaint,no bowel complaints (10 Aug 2017 23:20)      PAST MEDICAL & SURGICAL HISTORY:  Atrial fibrillation, unspecified type  Hepatic encephalopathy  Acute liver failure without hepatic coma  Umbilical hernia  Hernia of abdominal cavity  Alcohol abuse  History of appendectomy  Status post small bowel resection  History of hernia repair  H/O umbilical hernia repair      FAMILY HISTORY:        Social History:    MEDICATIONS  (STANDING):  thiamine 100 milliGRAM(s) Oral daily  folic acid 1 milliGRAM(s) Oral daily  multivitamin 1 Tablet(s) Oral daily  pantoprazole    Tablet 40 milliGRAM(s) Oral before breakfast  buDESOnide 160 MICROgram(s)/formoterol 4.5 MICROgram(s) Inhaler 2 Puff(s) Inhalation two times a day  furosemide   Injectable 40 milliGRAM(s) IV Push every 12 hours  apixaban 5 milliGRAM(s) Oral every 12 hours  magnesium sulfate  IVPB 2 Gram(s) IV Intermittent once  metFORMIN 500 milliGRAM(s) Oral two times a day with meals  magnesium oxide 400 milliGRAM(s) Oral three times a day with meals  potassium chloride    Tablet ER 40 milliEquivalent(s) Oral once  metoprolol 25 milliGRAM(s) Oral two times a day    MEDICATIONS  (PRN):  melatonin 6 milliGRAM(s) Oral at bedtime PRN Insomnia  acetaminophen   Tablet. 650 milliGRAM(s) Oral every 6 hours PRN Moderate Pain (4 - 6)        T(C): 36.6 (08-13-17 @ 07:54), Max: 36.7 (08-12-17 @ 15:10)  HR: 101 (08-13-17 @ 07:54) (100 - 113)  BP: 92/54 (08-13-17 @ 07:54) (90/55 - 103/64)  RR: 14 (08-13-17 @ 07:54) (14 - 18)  SpO2: 93% (08-13-17 @ 07:54) (93% - 95%)  Wt(kg): --    PHYSICAL EXAM:  GENERAL: NAD, well-groomed, well-developed  HEAD:  Atraumatic, Normocephalic  NECK: Supple, No JVD, Normal thyroid  CHEST/LUNG: Clear to percussion bilaterally; No rales, rhonchi, wheezing, or rubs  HEART: Regular rate and rhythm; No murmurs, rubs, or gallops  ABDOMEN: Soft, Nontender, Nondistended; Bowel sounds present  EXTREMITIES:  2+ Peripheral Pulses, No clubbing, cyanosis, or edema  SKIN: No rashes or lesions    CAPILLARY BLOOD GLUCOSE              CMP:  08-13 @ 06:40  SGPT 30  Albumin 3.3   Alk Phos 52   Anion Gap 7   SGOT 20   Total Bili 0.5   BUN 19   Calcium Total 8.7   CO2 30   Chloride 104   Creatinine 0.83   eGFR if    eGFR if non    Glucose 120   Potassium 3.5   Protein 6.7   Sodium 141      Thyroid Function Tests:  08-11 @ 14:10 TSH 1.41 FreeT4 -- T3 -- Anti TPO -- Anti Thyroglobulin Ab -- TSI --      Diabetes Tests:       Radiology:

## 2017-08-13 NOTE — PROGRESS NOTE ADULT - SUBJECTIVE AND OBJECTIVE BOX
PULMONARY/CRITICAL CARE      INTERVAL HPI/OVERNIGHT EVENTS:  Doing well. Mild sob. HR better. Ambulating. Tried cpap last nite.     56y MaleHPI:  55 y/o morbidly obese M pt w/ PMHx acute liver failure w/out hepatic coma, A fib, ETOH abuse, hepatic encephalopathy, umbilical hernia presents to the ED sent in by doctor for new onset a fib. Pt notes worsening swelling in his lower extremities. Pt states he is a  and works 14 hours a day, is active on feet throughout the day. Pt was having cough and SOB and though that he had infection and was not getting better and found to have afib RVR  ,no  calf pain, recent travel or any other complaints at this time. no fever, no urinary complaint,no bowel complaints (10 Aug 2017 23:20)        PAST MEDICAL & SURGICAL HISTORY:  Atrial fibrillation, unspecified type  Hepatic encephalopathy  Acute liver failure without hepatic coma  Umbilical hernia  Hernia of abdominal cavity  Alcohol abuse  History of appendectomy  Status post small bowel resection  History of hernia repair  H/O umbilical hernia repair        ICU Vital Signs Last 24 Hrs  T(C): 36.6 (13 Aug 2017 07:54), Max: 36.7 (12 Aug 2017 15:10)  T(F): 97.8 (13 Aug 2017 07:54), Max: 98.1 (12 Aug 2017 15:10)  HR: 101 (13 Aug 2017 07:54) (100 - 113)  BP: 92/54 (13 Aug 2017 07:54) (90/55 - 106/74)  BP(mean): --  ABP: --  ABP(mean): --  RR: 14 (13 Aug 2017 07:54) (14 - 18)  SpO2: 93% (13 Aug 2017 07:54) (92% - 95%)    Qtts:     I&O's Summary    12 Aug 2017 07:01  -  13 Aug 2017 07:00  --------------------------------------------------------  IN: 0 mL / OUT: 450 mL / NET: -450 mL            REVIEW OF SYSTEMS:    CONSTITUTIONAL: No fever, weight loss, or fatigue  EYES: No eye pain, visual disturbances, or discharge  ENMT:  No difficulty hearing, tinnitus, vertigo; No sinus or throat pain  NECK: No pain or stiffness  BREASTS: No pain, masses, or nipple discharge  RESPIRATORY: No cough, wheezing, chills or hemoptysis; Mild shortness of breath  CARDIOVASCULAR: No chest pain, palpitations, dizziness, or leg swelling  GASTROINTESTINAL: No abdominal or epigastric pain. No nausea, vomiting, or hematemesis; No diarrhea or constipation. No melena or hematochezia.  GENITOURINARY: No dysuria, frequency, hematuria, or incontinence  NEUROLOGICAL: No headaches, memory loss, loss of strength, numbness, or tremors  SKIN: No itching, burning, rashes, or lesions   LYMPH NODES: No enlarged glands  ENDOCRINE: No heat or cold intolerance; No hair loss  MUSCULOSKELETAL: No joint pain or swelling; No muscle, back, or extremity pain, no calf tenderness  PSYCHIATRIC: No depression, anxiety, mood swings, or difficulty sleeping  HEME/LYMPH: No easy bruising, or bleeding gums  ALLERGY AND IMMUNOLOGIC: No hives or eczema      PHYSICAL EXAM:    GENERAL: NAD, well-groomed, well-developed, NAD  HEAD:  Atraumatic, Normocephalic  EYES: EOMI, PERRLA, conjunctiva and sclera clear  ENMT: No tonsillar erythema, exudates, or enlargement; Moist mucous membranes, Good dentition, No lesions  NECK: Supple, No JVD, Normal thyroid  NERVOUS SYSTEM:  Alert & Oriented X3, Good concentration; Motor Strength 5/5 B/L upper and lower extremities  CHEST/LUNG: Clear to percussion bilaterally; No rales, rhonchi, wheezing, or rubs  HEART: Irregular rate and rhythm; No murmurs, rubs, or gallops  ABDOMEN: Soft, Nontender, Nondistended; Bowel sounds present Anasarcic  EXTREMITIES:  2+ Peripheral Pulses, No clubbing, cyanosis, or 2-3 plus pedal edema  LYMPH: No lymphadenopathy noted  SKIN: No rashes or lesions        LABS:    08-13    141  |  104  |  19  ----------------------------<  120<H>  3.5   |  30  |  0.83    Ca    8.7      13 Aug 2017 06:40  Mg     1.4     08-13    TPro  6.7  /  Alb  3.3  /  TBili  0.5  /  DBili  x   /  AST  20  /  ALT  30  /  AlkPhos  52  08-13              RADIOLOGY & ADDITIONAL STUDIES:      CRITICAL CARE TIME SPENT:

## 2017-08-13 NOTE — PROGRESS NOTE ADULT - ASSESSMENT
The patient is a 56 year old male with a history of alcohol abuse, cirrhosis, paroxysmal atrial fibrillation who is admitted with rapid atrial fibrillation.  Also recently stopped smoking.     8/13/17 Patient sitting in chair.  Feeling better.  Beginning to ambulate.  No significant complaints offered.  On C-PAP.  Monitor compatible with A-Fib.  He claims to be limiting fluid intake and had out-put about 4 liters.  Digoxin started.    Plan:  - Echocardiogram findings noted. LV systolic function is at least moderately reduced. IVC is plethoric.  - Continue furosemide 40 mg IV q12h.  - I&O.  - Will decrease metoprolol tartrate to 25 mg q12h. Will eventually need to be transitioned to succinate for heart failure  - Will not start ACEI/ARB yet due to borderline blood pressure.  - Will give potassium and magnesium.  - Follow labs.  - Given new finding of systolic heart failure, stroke risk increased from AF standpoint. Start apixaban 5 mg bid and discontinue aspirin.  - Restrict water and salt.  - Lose weight.  - Although BNP not significantly elevated, may be falsely low due to obesity  - Etiology of heart failure unclear, although differential includes tachycardia induced CM, alcoholic CM, or CAD. Will need to be re-imaged as outpatient on medical therapy,       and if EF remains low, may need cardiac catheterization for further work-up.    ADDENDUM:  Digoxin 0.25 mg PO q6h x4 doses started, then 0.25 mg daily

## 2017-08-13 NOTE — PROGRESS NOTE ADULT - SUBJECTIVE AND OBJECTIVE BOX
Patient is a 56y old  Male who presents with a chief complaint of sent pt from 's office for a-fib (11 Aug 2017 01:17)      INTERVAL HPI/OVERNIGHT EVENTS:no acute event    MEDICATIONS  (STANDING):  thiamine 100 milliGRAM(s) Oral daily  folic acid 1 milliGRAM(s) Oral daily  multivitamin 1 Tablet(s) Oral daily  pantoprazole    Tablet 40 milliGRAM(s) Oral before breakfast  buDESOnide 160 MICROgram(s)/formoterol 4.5 MICROgram(s) Inhaler 2 Puff(s) Inhalation two times a day  furosemide   Injectable 40 milliGRAM(s) IV Push every 12 hours  apixaban 5 milliGRAM(s) Oral every 12 hours  magnesium sulfate  IVPB 2 Gram(s) IV Intermittent once  metFORMIN 500 milliGRAM(s) Oral two times a day with meals  magnesium oxide 400 milliGRAM(s) Oral three times a day with meals  potassium chloride    Tablet ER 40 milliEquivalent(s) Oral once  metoprolol 25 milliGRAM(s) Oral two times a day    MEDICATIONS  (PRN):  melatonin 6 milliGRAM(s) Oral at bedtime PRN Insomnia  acetaminophen   Tablet. 650 milliGRAM(s) Oral every 6 hours PRN Moderate Pain (4 - 6)      Allergies    No Known Allergies    Intolerances          Vital Signs Last 24 Hrs  T(C): 36.6 (13 Aug 2017 07:54), Max: 36.7 (12 Aug 2017 15:10)  T(F): 97.8 (13 Aug 2017 07:54), Max: 98.1 (12 Aug 2017 15:10)  HR: 101 (13 Aug 2017 07:54) (100 - 113)  BP: 92/54 (13 Aug 2017 07:54) (90/55 - 103/64)  BP(mean): --  RR: 14 (13 Aug 2017 07:54) (14 - 18)  SpO2: 93% (13 Aug 2017 07:54) (93% - 95%)    LABS:    08-13    141  |  104  |  19  ----------------------------<  120<H>  3.5   |  30  |  0.83    Ca    8.7      13 Aug 2017 06:40  Mg     1.4     08-13    TPro  6.7  /  Alb  3.3  /  TBili  0.5  /  DBili  x   /  AST  20  /  ALT  30  /  AlkPhos  52  08-13        CAPILLARY BLOOD GLUCOSE              08-12 @ 07:01  -  08-13 @ 07:00  --------------------------------------------------------  IN: 0 mL / OUT: 450 mL / NET: -450 mL          RADIOLOGY & ADDITIONAL TESTS:    Imaging Personally Reviewed:  [x ] YES  [ ] NO    Consultant(s) Notes Reviewed:  [x ] YES  [ ] NO    Care Discussed with Consultants/Other Providers [x ] YES  [ ] NO

## 2017-08-13 NOTE — PROGRESS NOTE ADULT - ASSESSMENT
57 y/o morbidly obese M pt w/ PMHx acute liver failure w/out hepatic coma, A fib, ETOH abuse, hepatic encephalopathy, umbilical hernia presents to the ED sent in by doctor for new onset a fib. Pt notes worsening swelling in his lower extremities. Pt states he is a  and works 14 hours a day, is active on feet throughout the day. Pt was having cough and SOB and though that he had infection and was not getting better and found to have afib RVR  ,no  calf pain, recent travel or any other complaints at this time.  no fever, no urinary complaint,no bowel complaints  admittedwith AFIB RVR with h/o ETOH abuse , hepatic cirrhosis with ascites  afib rate improving, endocrine consult noted, hypomagnesemia repleted

## 2017-08-14 ENCOUNTER — TRANSCRIPTION ENCOUNTER (OUTPATIENT)
Age: 57
End: 2017-08-14

## 2017-08-14 VITALS
HEART RATE: 91 BPM | TEMPERATURE: 98 F | OXYGEN SATURATION: 96 % | SYSTOLIC BLOOD PRESSURE: 97 MMHG | RESPIRATION RATE: 16 BRPM | DIASTOLIC BLOOD PRESSURE: 71 MMHG

## 2017-08-14 LAB
ALBUMIN SERPL ELPH-MCNC: 3.5 G/DL — SIGNIFICANT CHANGE UP (ref 3.3–5)
ALP SERPL-CCNC: 59 U/L — SIGNIFICANT CHANGE UP (ref 30–120)
ALT FLD-CCNC: 29 U/L DA — SIGNIFICANT CHANGE UP (ref 10–60)
ANION GAP SERPL CALC-SCNC: 9 MMOL/L — SIGNIFICANT CHANGE UP (ref 5–17)
AST SERPL-CCNC: 24 U/L — SIGNIFICANT CHANGE UP (ref 10–40)
BILIRUB SERPL-MCNC: 0.5 MG/DL — SIGNIFICANT CHANGE UP (ref 0.2–1.2)
BUN SERPL-MCNC: 15 MG/DL — SIGNIFICANT CHANGE UP (ref 7–23)
CALCIUM SERPL-MCNC: 8.7 MG/DL — SIGNIFICANT CHANGE UP (ref 8.4–10.5)
CHLORIDE SERPL-SCNC: 101 MMOL/L — SIGNIFICANT CHANGE UP (ref 96–108)
CO2 SERPL-SCNC: 30 MMOL/L — SIGNIFICANT CHANGE UP (ref 22–31)
CREAT SERPL-MCNC: 0.88 MG/DL — SIGNIFICANT CHANGE UP (ref 0.5–1.3)
GLUCOSE SERPL-MCNC: 137 MG/DL — HIGH (ref 70–99)
POTASSIUM SERPL-MCNC: 3.7 MMOL/L — SIGNIFICANT CHANGE UP (ref 3.5–5.3)
POTASSIUM SERPL-SCNC: 3.7 MMOL/L — SIGNIFICANT CHANGE UP (ref 3.5–5.3)
PROT SERPL-MCNC: 7.3 G/DL — SIGNIFICANT CHANGE UP (ref 6–8.3)
SODIUM SERPL-SCNC: 140 MMOL/L — SIGNIFICANT CHANGE UP (ref 135–145)

## 2017-08-14 PROCEDURE — 84484 ASSAY OF TROPONIN QUANT: CPT

## 2017-08-14 PROCEDURE — 84100 ASSAY OF PHOSPHORUS: CPT

## 2017-08-14 PROCEDURE — 83036 HEMOGLOBIN GLYCOSYLATED A1C: CPT

## 2017-08-14 PROCEDURE — 36415 COLL VENOUS BLD VENIPUNCTURE: CPT

## 2017-08-14 PROCEDURE — 83735 ASSAY OF MAGNESIUM: CPT

## 2017-08-14 PROCEDURE — 87086 URINE CULTURE/COLONY COUNT: CPT

## 2017-08-14 PROCEDURE — 82140 ASSAY OF AMMONIA: CPT

## 2017-08-14 PROCEDURE — 85610 PROTHROMBIN TIME: CPT

## 2017-08-14 PROCEDURE — 94760 N-INVAS EAR/PLS OXIMETRY 1: CPT

## 2017-08-14 PROCEDURE — 96376 TX/PRO/DX INJ SAME DRUG ADON: CPT

## 2017-08-14 PROCEDURE — 85027 COMPLETE CBC AUTOMATED: CPT

## 2017-08-14 PROCEDURE — 80053 COMPREHEN METABOLIC PANEL: CPT

## 2017-08-14 PROCEDURE — 93306 TTE W/DOPPLER COMPLETE: CPT

## 2017-08-14 PROCEDURE — 93970 EXTREMITY STUDY: CPT

## 2017-08-14 PROCEDURE — 94640 AIRWAY INHALATION TREATMENT: CPT

## 2017-08-14 PROCEDURE — 84443 ASSAY THYROID STIM HORMONE: CPT

## 2017-08-14 PROCEDURE — 71045 X-RAY EXAM CHEST 1 VIEW: CPT

## 2017-08-14 PROCEDURE — 93010 ELECTROCARDIOGRAM REPORT: CPT

## 2017-08-14 PROCEDURE — 81001 URINALYSIS AUTO W/SCOPE: CPT

## 2017-08-14 PROCEDURE — 85730 THROMBOPLASTIN TIME PARTIAL: CPT

## 2017-08-14 PROCEDURE — 71250 CT THORAX DX C-: CPT

## 2017-08-14 PROCEDURE — 99285 EMERGENCY DEPT VISIT HI MDM: CPT

## 2017-08-14 PROCEDURE — 93005 ELECTROCARDIOGRAM TRACING: CPT

## 2017-08-14 PROCEDURE — 96374 THER/PROPH/DIAG INJ IV PUSH: CPT

## 2017-08-14 PROCEDURE — 94664 DEMO&/EVAL PT USE INHALER: CPT

## 2017-08-14 PROCEDURE — 83880 ASSAY OF NATRIURETIC PEPTIDE: CPT

## 2017-08-14 PROCEDURE — 94660 CPAP INITIATION&MGMT: CPT

## 2017-08-14 RX ORDER — APIXABAN 2.5 MG/1
1 TABLET, FILM COATED ORAL
Qty: 60 | Refills: 0 | OUTPATIENT
Start: 2017-08-14 | End: 2017-09-13

## 2017-08-14 RX ORDER — METOPROLOL TARTRATE 50 MG
50 TABLET ORAL DAILY
Qty: 0 | Refills: 0 | Status: DISCONTINUED | OUTPATIENT
Start: 2017-08-14 | End: 2017-08-14

## 2017-08-14 RX ORDER — BUDESONIDE AND FORMOTEROL FUMARATE DIHYDRATE 160; 4.5 UG/1; UG/1
1 AEROSOL RESPIRATORY (INHALATION)
Qty: 1 | Refills: 0 | OUTPATIENT
Start: 2017-08-14 | End: 2017-09-13

## 2017-08-14 RX ORDER — FUROSEMIDE 40 MG
1 TABLET ORAL
Qty: 30 | Refills: 0 | OUTPATIENT
Start: 2017-08-14 | End: 2017-08-29

## 2017-08-14 RX ORDER — METOPROLOL TARTRATE 50 MG
1 TABLET ORAL
Qty: 30 | Refills: 0 | OUTPATIENT
Start: 2017-08-14 | End: 2017-09-13

## 2017-08-14 RX ORDER — LANOLIN ALCOHOL/MO/W.PET/CERES
2 CREAM (GRAM) TOPICAL
Qty: 0 | Refills: 0 | COMMUNITY
Start: 2017-08-14

## 2017-08-14 RX ORDER — POTASSIUM CHLORIDE 20 MEQ
1 PACKET (EA) ORAL
Qty: 60 | Refills: 0 | OUTPATIENT
Start: 2017-08-14 | End: 2017-09-13

## 2017-08-14 RX ORDER — DIGOXIN 250 MCG
1 TABLET ORAL
Qty: 30 | Refills: 0 | OUTPATIENT
Start: 2017-08-14 | End: 2017-09-13

## 2017-08-14 RX ORDER — ACETAMINOPHEN 500 MG
2 TABLET ORAL
Qty: 0 | Refills: 0 | COMMUNITY
Start: 2017-08-14

## 2017-08-14 RX ORDER — MAGNESIUM OXIDE 400 MG ORAL TABLET 241.3 MG
1 TABLET ORAL
Qty: 15 | Refills: 0 | OUTPATIENT
Start: 2017-08-14 | End: 2017-08-19

## 2017-08-14 RX ORDER — PANTOPRAZOLE SODIUM 20 MG/1
1 TABLET, DELAYED RELEASE ORAL
Qty: 30 | Refills: 0 | OUTPATIENT
Start: 2017-08-14 | End: 2017-09-13

## 2017-08-14 RX ORDER — THIAMINE MONONITRATE (VIT B1) 100 MG
1 TABLET ORAL
Qty: 0 | Refills: 0 | COMMUNITY
Start: 2017-08-14

## 2017-08-14 RX ORDER — FOLIC ACID 0.8 MG
1 TABLET ORAL
Qty: 0 | Refills: 0 | COMMUNITY
Start: 2017-08-14

## 2017-08-14 RX ORDER — METFORMIN HYDROCHLORIDE 850 MG/1
1 TABLET ORAL
Qty: 60 | Refills: 0 | OUTPATIENT
Start: 2017-08-14 | End: 2017-09-13

## 2017-08-14 RX ORDER — FUROSEMIDE 40 MG
80 TABLET ORAL
Qty: 0 | Refills: 0 | Status: DISCONTINUED | OUTPATIENT
Start: 2017-08-14 | End: 2017-08-14

## 2017-08-14 RX ADMIN — APIXABAN 5 MILLIGRAM(S): 2.5 TABLET, FILM COATED ORAL at 05:29

## 2017-08-14 RX ADMIN — Medication 100 MILLIGRAM(S): at 12:01

## 2017-08-14 RX ADMIN — Medication 1 MILLIGRAM(S): at 12:01

## 2017-08-14 RX ADMIN — Medication 40 MILLIGRAM(S): at 10:30

## 2017-08-14 RX ADMIN — MAGNESIUM OXIDE 400 MG ORAL TABLET 400 MILLIGRAM(S): 241.3 TABLET ORAL at 12:01

## 2017-08-14 RX ADMIN — BUDESONIDE AND FORMOTEROL FUMARATE DIHYDRATE 2 PUFF(S): 160; 4.5 AEROSOL RESPIRATORY (INHALATION) at 05:29

## 2017-08-14 RX ADMIN — METFORMIN HYDROCHLORIDE 500 MILLIGRAM(S): 850 TABLET ORAL at 08:50

## 2017-08-14 RX ADMIN — MAGNESIUM OXIDE 400 MG ORAL TABLET 400 MILLIGRAM(S): 241.3 TABLET ORAL at 08:50

## 2017-08-14 RX ADMIN — PANTOPRAZOLE SODIUM 40 MILLIGRAM(S): 20 TABLET, DELAYED RELEASE ORAL at 05:29

## 2017-08-14 RX ADMIN — Medication 25 MILLIGRAM(S): at 05:29

## 2017-08-14 RX ADMIN — Medication 1 TABLET(S): at 12:01

## 2017-08-14 RX ADMIN — Medication 50 MILLIGRAM(S): at 10:53

## 2017-08-14 RX ADMIN — Medication 0.25 MILLIGRAM(S): at 05:29

## 2017-08-14 NOTE — PROGRESS NOTE ADULT - PROBLEM SELECTOR PLAN 4
Watch labs
Watch labs
Continue Protonix daily
Watch labs
o2 , bronchodilators

## 2017-08-14 NOTE — PROGRESS NOTE ADULT - BREASTS
No masses; no nipple discharge

## 2017-08-14 NOTE — DISCHARGE NOTE ADULT - MEDICATION SUMMARY - MEDICATIONS TO TAKE
I will START or STAY ON the medications listed below when I get home from the hospital:    acetaminophen 325 mg oral tablet  -- 2 tab(s) by mouth every 6 hours, As needed, Moderate Pain (4 - 6)  -- Indication: For pain    digoxin 250 mcg (0.25 mg) oral tablet  -- 1 tab(s) by mouth once a day  -- Indication: For Afib    apixaban 5 mg oral tablet  -- 1 tab(s) by mouth every 12 hours  -- Indication: For Afib    metFORMIN 500 mg oral tablet  -- 1 tab(s) by mouth 2 times a day (with meals)  -- Indication: For Dm2    metoprolol succinate 50 mg oral tablet, extended release  -- 1 tab(s) by mouth once a day  -- Indication: For Afib    budesonide-formoterol 160 mcg-4.5 mcg/inh inhalation aerosol  -- 1 inhaler(s) inhaled once a day  -- Indication: For Johnson    furosemide 80 mg oral tablet  -- 1 tab(s) by mouth 2 times a day  -- Indication: For Chf    magnesium oxide 400 mg (241.3 mg elemental magnesium) oral tablet  -- 1 tab(s) by mouth 3 times a day (with meals)  -- Indication: For low mag    potassium chloride 20 mEq oral tablet, extended release  -- 1 tab(s) by mouth 2 times a day  -- Indication: For On lasix    melatonin 3 mg oral tablet  -- 2 tab(s) by mouth once a day (at bedtime), As needed, Insomnia  -- Indication: For insomnia    pantoprazole 40 mg oral delayed release tablet  -- 1 tab(s) by mouth once a day (before a meal)  -- Indication: For GERD (gastroesophageal reflux disease)    Multiple Vitamins oral tablet  -- 1 tab(s) by mouth once a day  -- Indication: For Cirrhosis of liver with ascites, unspecified hepatic cirrhosis type    folic acid 1 mg oral tablet  -- 1 tab(s) by mouth once a day  -- Indication: For Cirrhosis of liver with ascites, unspecified hepatic cirrhosis type    thiamine 100 mg oral tablet  -- 1 tab(s) by mouth once a day  -- Indication: For Cirrhosis of liver with ascites, unspecified hepatic cirrhosis type

## 2017-08-14 NOTE — PROGRESS NOTE ADULT - ATTENDING COMMENTS
I have discussed care plan with patient expressed understanding of problems treatment and their effect and side effects, alternatives in detail,I have asked if they have any questions and concerns and appropriately addressed them to best of my ability  Reviewed all diagonostic tests, lab results and drug drug interactions, and medications  45 minutes spent on this visit, 50% visit time spent in care co-ordination with other attendings and counselling patient
I have discussed care plan with patient expressed understanding of problems treatment and their effect and side effects, alternatives in detail,I have asked if they have any questions and concerns and appropriately addressed them to best of my ability  Reviewed all diagonostic tests, lab results and drug drug interactions, and medications  60 minutes spent on this visit, 50% visit time spent in care co-ordination with other attendings and counselling patient
I have discussed care plan with patient expressed understanding of problems treatment and their effect and side effects, alternatives in detail,I have asked if they have any questions and concerns and appropriately addressed them to best of my ability  Reviewed all diagonostic tests, lab results and drug drug interactions, and medications  45 minutes spent on this visit, 50% visit time spent in care co-ordination with other attendings and counselling patient
I have discussed care plan with patient expressed understanding of problems treatment and their effect and side effects, alternatives in detail,I have asked if they have any questions and concerns and appropriately addressed them to best of my ability  Reviewed all diagonostic tests, lab results and drug drug interactions, and medications  45 minutes spent on this visit, 50% visit time spent in care co-ordination with other attendings and counselling patient

## 2017-08-14 NOTE — PROGRESS NOTE ADULT - PROBLEM SELECTOR PLAN 1
Rate control  Cardiology f/u  Eliquis
Rate control  Cardiology f/u  Eliquis
Continue to titrate Cardizem infusion per protocol to achieve goal heart rate < 115 as hemodynamically tolerated.   Currently rate controlled (-120) at Cardizem 10mg IV.   Pending official echo read  Continue to trend troponin, serial EKG  Will likely require diuresis but will hold off for now given borderline hypotension on Cardizem gtt  No anticoagulation at this time per Cardiology recommendations as chads-vasc = 0.  Will continue home Aspirin 81  Magnesium supplementation for hypomagnesemia   Will continue to trend electrolytes and replace as needed  Cardiology following
Rate control  Cardiology f/u  Eliquis
cardizem iv, transferred from ICU 8/12, cardio f up
metoprolol, cardio f up
cardizem iv, transferred from ICU 8/12, cardio f up
cardizem iv, icu care, cardio consult

## 2017-08-14 NOTE — PROGRESS NOTE ADULT - PROBLEM SELECTOR PLAN 3
Wt. loss
Wt. loss
Pt states he likely has WILMER, will trial CPAP HS per pt's request  Will require outpatient sleep study  Dietary consult  DASH diet  Borderline DM, check HgA1C in AM
Wt. loss
counselled

## 2017-08-14 NOTE — PROGRESS NOTE ADULT - SUBJECTIVE AND OBJECTIVE BOX
Chief Complaint: Shortness of breath    Interval Events: No events overnight. Breathing significantly improved from prior.    Review of Systems:  General: No fevers, chills, weight loss or gain  Skin: No rashes, color changes  Cardiovascular: No chest pain, orthopnea  Respiratory: No shortness of breath, cough  Gastrointestinal: No nausea, abdominal pain  Genitourinary: No incontinence, pain with urination  Musculoskeletal: No pain, swelling, decreased range of motion  Neurological: No headache, weakness  Psychiatric: No depression, anxiety  Endocrine: No weight loss or gain, increased thirst  All other systems are comprehensively negative.    Physical Exam:  Vital Signs Last 24 Hrs  T(C): 36.5 (14 Aug 2017 07:19), Max: 36.9 (13 Aug 2017 15:31)  T(F): 97.7 (14 Aug 2017 07:19), Max: 98.4 (13 Aug 2017 15:31)  HR: 102 (14 Aug 2017 07:19) (71 - 105)  BP: 117/86 (14 Aug 2017 07:19) (95/61 - 117/86)  BP(mean): --  RR: 18 (14 Aug 2017 07:19) (16 - 18)  SpO2: 94% (14 Aug 2017 07:19) (92% - 95%)  General: NAD  Neck: No JVD, no carotid bruit  Lungs: rales at bases  CV: RRR, nl S1/S2, no M/R/G  Abdomen: S/NT/ND, +BS  Extremities: 3+ LE edema, no cyanosis    Labs:             08-14    140  |  101  |  15  ----------------------------<  137<H>  3.7   |  30  |  0.88    Ca    8.7      14 Aug 2017 06:48  Mg     1.4     08-13    TPro  7.3  /  Alb  3.5  /  TBili  0.5  /  DBili  x   /  AST  24  /  ALT  29  /  AlkPhos  59  08-14        Telemetry: AF, rates

## 2017-08-14 NOTE — DISCHARGE NOTE ADULT - HOSPITAL COURSE
55 y/o morbidly obese M pt w/ PMHx acute liver failure w/out hepatic coma, A fib, ETOH abuse, hepatic encephalopathy, umbilical hernia presents to the ED sent in by doctor for new onset a fib. Pt notes worsening swelling in his lower extremities. Pt states he is a  and works 14 hours a day, is active on feet throughout the day. Pt was having cough and SOB and though that he had infection and was not getting better and found to have afib RVR  ,no  calf pain, recent travel or any other complaints at this time.  no fever, no urinary complaint,no bowel complaints  admittedwith AFIB RVR with h/o ETOH abuse , hepatic cirrhosis with ascites  afib rate improving, endocrine consult noted, hypomagnesemia repleted, rate is in fair control, patient wants to leave AMA, being discharged on request as he will follow with DR Kenan Cm and monitor HR BP, magnesium anf bmp, has mild systolic heart failure with paroxysmal afib, needs further w up out pt, not on ACEI as loww bp.stable at discharge, understands risks

## 2017-08-14 NOTE — PROGRESS NOTE ADULT - PROBLEM SELECTOR PLAN 2
EtOH cessation counseling provided  Thiamine / folate / multivitamin supplementation  Monitor closely for withdrawal symptoms  No appreciable ascites on bedside ultrasound  Continue to trend LFTs
continue home meds

## 2017-08-14 NOTE — PROGRESS NOTE ADULT - RS GEN PE MLT RESP DETAILS PC
airway patent/clear to auscultation bilaterally
airway patent/clear to auscultation bilaterally
airway patent/diminished breath sounds, R/diminished breath sounds, L
diminished breath sounds, R/airway patent/diminished breath sounds, L

## 2017-08-14 NOTE — PROGRESS NOTE ADULT - SUBJECTIVE AND OBJECTIVE BOX
Patient is a 56y old  Male who presents with a chief complaint of sent pt from 's office for a-fib (11 Aug 2017 01:17)      INTERVAL HPI/OVERNIGHT EVENTS:no acute event  pt wants to leave, being discharged on request and ok from cardiology    MEDICATIONS  (STANDING):  thiamine 100 milliGRAM(s) Oral daily  folic acid 1 milliGRAM(s) Oral daily  multivitamin 1 Tablet(s) Oral daily  pantoprazole    Tablet 40 milliGRAM(s) Oral before breakfast  buDESOnide 160 MICROgram(s)/formoterol 4.5 MICROgram(s) Inhaler 2 Puff(s) Inhalation two times a day  furosemide   Injectable 40 milliGRAM(s) IV Push every 12 hours  apixaban 5 milliGRAM(s) Oral every 12 hours  digoxin     Tablet 0.25 milliGRAM(s) Oral daily  metFORMIN 500 milliGRAM(s) Oral two times a day with meals  magnesium oxide 400 milliGRAM(s) Oral three times a day with meals  metoprolol succinate ER 50 milliGRAM(s) Oral daily    MEDICATIONS  (PRN):  melatonin 6 milliGRAM(s) Oral at bedtime PRN Insomnia  acetaminophen   Tablet. 650 milliGRAM(s) Oral every 6 hours PRN Moderate Pain (4 - 6)      Allergies    No Known Allergies    Intolerances          Vital Signs Last 24 Hrs  T(C): 36.5 (14 Aug 2017 07:19), Max: 36.9 (13 Aug 2017 15:31)  T(F): 97.7 (14 Aug 2017 07:19), Max: 98.4 (13 Aug 2017 15:31)  HR: 102 (14 Aug 2017 07:19) (71 - 105)  BP: 117/86 (14 Aug 2017 07:19) (95/61 - 117/86)  BP(mean): --  RR: 18 (14 Aug 2017 07:19) (16 - 18)  SpO2: 94% (14 Aug 2017 07:19) (92% - 95%)    LABS:    08-14    140  |  101  |  15  ----------------------------<  137<H>  3.7   |  30  |  0.88    Ca    8.7      14 Aug 2017 06:48  Mg     1.4     08-13    TPro  7.3  /  Alb  3.5  /  TBili  0.5  /  DBili  x   /  AST  24  /  ALT  29  /  AlkPhos  59  08-14        CAPILLARY BLOOD GLUCOSE  110 (14 Aug 2017 07:31)  117 (13 Aug 2017 16:54)              08-13 @ 07:01 - 08-14 @ 07:00  --------------------------------------------------------  IN: 0 mL / OUT: 5650 mL / NET: -5650 mL          RADIOLOGY & ADDITIONAL TESTS:    Imaging Personally Reviewed:  [ x] YES  [ ] NO    Consultant(s) Notes Reviewed:  [x ] YES  [ ] NO    Care Discussed with Consultants/Other Providers x[ ] YES  [ ] NO

## 2017-08-14 NOTE — DISCHARGE NOTE ADULT - CARE PROVIDER_API CALL
Kenan Cm), Cardiovascular Disease; Internal Medicine  [address]  [address]  [place] [address]  Phone: [phone]  Fax: [phone]

## 2017-08-14 NOTE — PROGRESS NOTE ADULT - SUBJECTIVE AND OBJECTIVE BOX
PULMONARY/CRITICAL CARE      INTERVAL HPI/OVERNIGHT EVENTS:  Doing better, less sob. Ambulating. No desat. used cpap at nite .  HR better.  56y MaleHPI:  57 y/o morbidly obese M pt w/ PMHx acute liver failure w/out hepatic coma, A fib, ETOH abuse, hepatic encephalopathy, umbilical hernia presents to the ED sent in by doctor for new onset a fib. Pt notes worsening swelling in his lower extremities. Pt states he is a  and works 14 hours a day, is active on feet throughout the day. Pt was having cough and SOB and though that he had infection and was not getting better and found to have afib RVR  ,no  calf pain, recent travel or any other complaints at this time. no fever, no urinary complaint,no bowel complaints (10 Aug 2017 23:20)        PAST MEDICAL & SURGICAL HISTORY:  Atrial fibrillation, unspecified type  Hepatic encephalopathy  Acute liver failure without hepatic coma  Umbilical hernia  Hernia of abdominal cavity  Alcohol abuse  History of appendectomy  Status post small bowel resection  History of hernia repair  H/O umbilical hernia repair        ICU Vital Signs Last 24 Hrs  T(C): 36.5 (14 Aug 2017 07:19), Max: 36.9 (13 Aug 2017 15:31)  T(F): 97.7 (14 Aug 2017 07:19), Max: 98.4 (13 Aug 2017 15:31)  HR: 102 (14 Aug 2017 07:19) (71 - 105)  BP: 117/86 (14 Aug 2017 07:19) (95/61 - 117/86)  BP(mean): --  ABP: --  ABP(mean): --  RR: 18 (14 Aug 2017 07:19) (16 - 18)  SpO2: 94% (14 Aug 2017 07:19) (92% - 95%)    Qtts:     I&O's Summary    13 Aug 2017 07:01  -  14 Aug 2017 07:00  --------------------------------------------------------  IN: 0 mL / OUT: 5650 mL / NET: -5650 mL              REVIEW OF SYSTEMS:    CONSTITUTIONAL: No fever, weight loss, or fatigue  EYES: No eye pain, visual disturbances, or discharge  ENMT:  No difficulty hearing, tinnitus, vertigo; No sinus or throat pain  NECK: No pain or stiffness  BREASTS: No pain, masses, or nipple discharge  RESPIRATORY: No cough, wheezing, chills or hemoptysis; Mild shortness of breath  CARDIOVASCULAR: No chest pain, palpitations, dizziness, or leg swelling  GASTROINTESTINAL: No abdominal or epigastric pain. No nausea, vomiting, or hematemesis; No diarrhea or constipation. No melena or hematochezia.  GENITOURINARY: No dysuria, frequency, hematuria, or incontinence  NEUROLOGICAL: No headaches, memory loss, loss of strength, numbness, or tremors  SKIN: No itching, burning, rashes, or lesions   LYMPH NODES: No enlarged glands  ENDOCRINE: No heat or cold intolerance; No hair loss  MUSCULOSKELETAL: No joint pain or swelling; No muscle, back, or extremity pain, no calf tenderness  PSYCHIATRIC: No depression, anxiety, mood swings, or difficulty sleeping  HEME/LYMPH: No easy bruising, or bleeding gums  ALLERGY AND IMMUNOLOGIC: No hives or eczema      PHYSICAL EXAM:    GENERAL: NAD, well-groomed, well-developed, NAD  HEAD:  Atraumatic, Normocephalic  EYES: EOMI, PERRLA, conjunctiva and sclera clear  ENMT: No tonsillar erythema, exudates, or enlargement; Moist mucous membranes, Good dentition, No lesions  NECK: Supple, No JVD, Normal thyroid  NERVOUS SYSTEM:  Alert & Oriented X3, Good concentration; Motor Strength 5/5 B/L upper and lower extremities  CHEST/LUNG: Clear to percussion bilaterally; No rales, rhonchi, wheezing, or rubs  HEART: Irregular rate and rhythm; No murmurs, rubs, or gallops  ABDOMEN: Soft, Nontender, Nondistended; Bowel sounds present Anasarcic  EXTREMITIES:  2+ Peripheral Pulses, No clubbing, cyanosis, or 2-3 plus pedal edema  LYMPH: No lymphadenopathy noted  SKIN: No rashes or lesions          LABS:    08-14    140  |  101  |  15  ----------------------------<  137<H>  3.7   |  30  |  0.88    Ca    8.7      14 Aug 2017 06:48  Mg     1.4     08-13    TPro  7.3  /  Alb  3.5  /  TBili  0.5  /  DBili  x   /  AST  24  /  ALT  29  /  AlkPhos  59  08-14          vanco through     RADIOLOGY & ADDITIONAL STUDIES:  CXR ok      CRITICAL CARE TIME SPENT:

## 2017-08-14 NOTE — PROGRESS NOTE ADULT - NEGATIVE CARDIOVASCULAR SYMPTOMS
no palpitations/no chest pain
no chest pain

## 2017-08-14 NOTE — DISCHARGE NOTE ADULT - CARE PLAN
Principal Discharge DX:	Atrial fibrillation with RVR  Goal:	rate control  Instructions for follow-up, activity and diet:	metoprolol,lasix, epixaban  Secondary Diagnosis:	Cirrhosis of liver with ascites, unspecified hepatic cirrhosis type  Instructions for follow-up, activity and diet:	vitamins f up with Gi  Secondary Diagnosis:	Chronic obstructive pulmonary disease, unspecified COPD type  Instructions for follow-up, activity and diet:	continue home meds cpap  Secondary Diagnosis:	Type 2 diabetes mellitus with hyperglycemia, without long-term current use of insulin  Instructions for follow-up, activity and diet:	metfomin  Secondary Diagnosis:	Gastroesophageal reflux disease without esophagitis  Instructions for follow-up, activity and diet:	pantoprazole  Secondary Diagnosis:	Morbid obesity, unspecified obesity type  Instructions for follow-up, activity and diet:	cpap, diet exercise

## 2017-08-14 NOTE — PROGRESS NOTE ADULT - ASSESSMENT
55 y/o morbidly obese M pt w/ PMHx acute liver failure w/out hepatic coma, A fib, ETOH abuse, hepatic encephalopathy, umbilical hernia presents to the ED sent in by doctor for new onset a fib. Pt notes worsening swelling in his lower extremities. Pt states he is a  and works 14 hours a day, is active on feet throughout the day. Pt was having cough and SOB and though that he had infection and was not getting better and found to have afib RVR  ,no  calf pain, recent travel or any other complaints at this time.  no fever, no urinary complaint,no bowel complaints  admittedwith AFIB RVR with h/o ETOH abuse , hepatic cirrhosis with ascites  afib rate improving, endocrine consult noted, hypomagnesemia repleted, rate is in fair control, patient wants to leave AMA, being discharged on request as he will follow with DR Kenan Cm and monitor HR BP, magnesium anf bmp

## 2017-08-14 NOTE — DISCHARGE NOTE ADULT - PLAN OF CARE
rate control metoprolol,lasix, epixaban vitamins f up with Gi continue home meds cpap metfomin pantoprazole cpap, diet exercise

## 2017-08-14 NOTE — PROGRESS NOTE ADULT - NSHPATTENDINGPLANDISCUSS_GEN_ALL_CORE
PA Vitaliy in detail
nursing in detail
nursing in detail
patient
patient and ICU PA

## 2017-08-14 NOTE — PROGRESS NOTE ADULT - NS NEC GEN PE MLT EXAM PC
detailed exam
No bruits; no thyromegaly or nodules

## 2017-08-14 NOTE — PROGRESS NOTE ADULT - PROBLEM SELECTOR PLAN 7
Will need PSG as outpt.
diuretics cadio f up

## 2017-08-14 NOTE — DISCHARGE NOTE ADULT - SECONDARY DIAGNOSIS.
Cirrhosis of liver with ascites, unspecified hepatic cirrhosis type Chronic obstructive pulmonary disease, unspecified COPD type Type 2 diabetes mellitus with hyperglycemia, without long-term current use of insulin Gastroesophageal reflux disease without esophagitis Morbid obesity, unspecified obesity type

## 2017-08-14 NOTE — PROGRESS NOTE ADULT - CARDIOVASCULAR DETAILS
irregular rate and rhythm/positive S2/positive S1
positive S1/positive S2/irregular rate and rhythm/tachycardia
positive S2/irregular rate and rhythm/tachycardia/positive S1
positive S1/positive S2

## 2017-08-14 NOTE — DISCHARGE NOTE ADULT - PATIENT PORTAL LINK FT
“You can access the [facility] Patient Portal, offered by [facility], by registering with the following website: [email or web address]”

## 2017-08-14 NOTE — PROGRESS NOTE ADULT - PROBLEM SELECTOR PROBLEM 1
Atrial fibrillation with RVR

## 2017-08-14 NOTE — PROGRESS NOTE ADULT - ASSESSMENT
The patient is a 56 year old male with a history of alcohol abuse, cirrhosis, paroxysmal atrial fibrillation who is admitted with rapid atrial fibrillation.    Plan:  - Echocardiogram findings noted. LV systolic function is at least moderately reduced. IVC is plethoric.  - Transition to furosemide 80 mg PO bid on discharge  - Transition to metoprolol succinate 50 mg daily  - Will attempt to start ACEI as outpatient. Will not start now due to low BPs.  - Continue apixaban 5 mg bid for stroke risk reduction with AF  - HbA1c at 7  - Patient requesting to be discharged today. Will transition to oral medications. Patient will need close follow-up with me. He will likely need an ischemic work-up as an outpatient.

## 2017-08-14 NOTE — PROGRESS NOTE ADULT - GASTROINTESTINAL DETAILS
bowel sounds normal/nontender/soft
bowel sounds normal/soft
nontender/soft/bowel sounds normal
bowel sounds normal/nontender/soft

## 2017-08-14 NOTE — PROGRESS NOTE ADULT - PROBLEM SELECTOR PROBLEM 2
GERD (gastroesophageal reflux disease)
Alcohol abuse
Cirrhosis of liver with ascites, unspecified hepatic cirrhosis type

## 2019-08-08 NOTE — H&P ADULT - NSCORESITESY/N_GEN_A_CORE_RD
No Tazorac Pregnancy And Lactation Text: This medication is not safe during pregnancy. It is unknown if this medication is excreted in breast milk.

## 2020-10-13 NOTE — ED ADULT NURSE REASSESSMENT NOTE - GENERAL PATIENT STATE
HR=58 bpm, CBIS=689/61 mmhg, SpO2=99.0 %, Resp=14 B/min, EtCO2=30 mmHg, Pain=0, Дмитрий=9, Puente=2 comfortable appearance

## 2020-10-26 ENCOUNTER — TRANSCRIPTION ENCOUNTER (OUTPATIENT)
Age: 60
End: 2020-10-26

## 2021-12-07 NOTE — ED PROVIDER NOTE - CRITICAL CARE PROVIDED
Super swollen between eyes, sore, can only breathe out of 1 nostril at a time. Do you want to work in this afternoon? direct patient care (not related to procedure)/additional history taking/documentation

## 2022-08-10 NOTE — PROGRESS NOTE ADULT - PROBLEM SELECTOR PLAN 5
VISIT FACILITATOR collected or printed forms from . Forms pre-filled for provider review, completion, and signature. Forms placed in provider blue folder. Thanks.    cpap

## 2023-05-24 NOTE — PATIENT PROFILE ADULT. - FALL HARM RISK
[FreeTextEntry1] : Labs as above
obese/coagulation(Bleeding disorder R/T clinical cond/anti-coags)/other